# Patient Record
Sex: MALE | Race: WHITE | NOT HISPANIC OR LATINO
[De-identification: names, ages, dates, MRNs, and addresses within clinical notes are randomized per-mention and may not be internally consistent; named-entity substitution may affect disease eponyms.]

---

## 2017-02-27 ENCOUNTER — APPOINTMENT (OUTPATIENT)
Dept: RHEUMATOLOGY | Facility: CLINIC | Age: 62
End: 2017-02-27

## 2017-02-27 VITALS
SYSTOLIC BLOOD PRESSURE: 120 MMHG | HEART RATE: 71 BPM | TEMPERATURE: 97.8 F | BODY MASS INDEX: 25.9 KG/M2 | DIASTOLIC BLOOD PRESSURE: 76 MMHG | OXYGEN SATURATION: 98 % | WEIGHT: 165 LBS | HEIGHT: 67 IN

## 2017-02-27 DIAGNOSIS — B18.2 CHRONIC VIRAL HEPATITIS C: ICD-10-CM

## 2017-02-27 DIAGNOSIS — M06.4 INFLAMMATORY POLYARTHROPATHY: ICD-10-CM

## 2017-02-27 DIAGNOSIS — M06.9 RHEUMATOID ARTHRITIS, UNSPECIFIED: ICD-10-CM

## 2017-02-27 RX ORDER — PREDNISONE 5 MG/1
5 TABLET ORAL
Qty: 30 | Refills: 1 | Status: ACTIVE | COMMUNITY
Start: 2017-02-27 | End: 1900-01-01

## 2017-02-27 RX ORDER — HYDROXYCHLOROQUINE SULFATE 200 MG/1
200 TABLET, FILM COATED ORAL
Qty: 60 | Refills: 1 | Status: ACTIVE | COMMUNITY
Start: 2017-02-27 | End: 1900-01-01

## 2017-04-10 ENCOUNTER — APPOINTMENT (OUTPATIENT)
Dept: RHEUMATOLOGY | Facility: CLINIC | Age: 62
End: 2017-04-10

## 2020-05-02 ENCOUNTER — EMERGENCY (EMERGENCY)
Facility: HOSPITAL | Age: 65
LOS: 1 days | Discharge: HOME | End: 2020-05-02
Attending: EMERGENCY MEDICINE | Admitting: EMERGENCY MEDICINE
Payer: MEDICARE

## 2020-05-02 VITALS
DIASTOLIC BLOOD PRESSURE: 84 MMHG | HEART RATE: 85 BPM | WEIGHT: 138.01 LBS | HEIGHT: 67 IN | SYSTOLIC BLOOD PRESSURE: 129 MMHG | TEMPERATURE: 98 F | OXYGEN SATURATION: 97 % | RESPIRATION RATE: 16 BRPM

## 2020-05-02 LAB
ALBUMIN SERPL ELPH-MCNC: 4.1 G/DL — SIGNIFICANT CHANGE UP (ref 3.3–5)
ALT FLD-CCNC: 31 U/L — SIGNIFICANT CHANGE UP (ref 12–78)
ANION GAP SERPL CALC-SCNC: 6 MMOL/L — SIGNIFICANT CHANGE UP (ref 5–17)
APTT BLD: 29.9 SEC — SIGNIFICANT CHANGE UP (ref 28.5–37)
AST SERPL-CCNC: 43 U/L — HIGH (ref 15–37)
BASOPHILS # BLD AUTO: 0.03 K/UL — SIGNIFICANT CHANGE UP (ref 0–0.2)
BASOPHILS NFR BLD AUTO: 0.3 % — SIGNIFICANT CHANGE UP (ref 0–2)
BILIRUB DIRECT SERPL-MCNC: 0.2 MG/DL — SIGNIFICANT CHANGE UP (ref 0.05–0.2)
BILIRUB INDIRECT FLD-MCNC: 0.1 MG/DL — LOW (ref 0.2–1)
BILIRUB SERPL-MCNC: 0.3 MG/DL — SIGNIFICANT CHANGE UP (ref 0.2–1.2)
BUN SERPL-MCNC: 27 MG/DL — HIGH (ref 7–23)
CALCIUM SERPL-MCNC: 9.4 MG/DL — SIGNIFICANT CHANGE UP (ref 8.5–10.1)
CHLORIDE SERPL-SCNC: 107 MMOL/L — SIGNIFICANT CHANGE UP (ref 96–108)
CO2 SERPL-SCNC: 30 MMOL/L — SIGNIFICANT CHANGE UP (ref 22–31)
CREAT SERPL-MCNC: 1 MG/DL — SIGNIFICANT CHANGE UP (ref 0.5–1.3)
EOSINOPHIL # BLD AUTO: 0.14 K/UL — SIGNIFICANT CHANGE UP (ref 0–0.5)
EOSINOPHIL NFR BLD AUTO: 1.5 % — SIGNIFICANT CHANGE UP (ref 0–6)
GLUCOSE SERPL-MCNC: 111 MG/DL — HIGH (ref 70–99)
HCT VFR BLD CALC: 40 % — SIGNIFICANT CHANGE UP (ref 39–50)
HGB BLD-MCNC: 13.3 G/DL — SIGNIFICANT CHANGE UP (ref 13–17)
IMM GRANULOCYTES NFR BLD AUTO: 0.3 % — SIGNIFICANT CHANGE UP (ref 0–1.5)
INR BLD: 0.97 RATIO — SIGNIFICANT CHANGE UP (ref 0.88–1.16)
LACTATE SERPL-SCNC: 0.6 MMOL/L — LOW (ref 0.7–2)
LIDOCAIN IGE QN: 122 U/L — SIGNIFICANT CHANGE UP (ref 73–393)
LYMPHOCYTES # BLD AUTO: 0.87 K/UL — LOW (ref 1–3.3)
LYMPHOCYTES # BLD AUTO: 9.3 % — LOW (ref 13–44)
MCHC RBC-ENTMCNC: 29.8 PG — SIGNIFICANT CHANGE UP (ref 27–34)
MCHC RBC-ENTMCNC: 33.3 GM/DL — SIGNIFICANT CHANGE UP (ref 32–36)
MCV RBC AUTO: 89.5 FL — SIGNIFICANT CHANGE UP (ref 80–100)
MONOCYTES # BLD AUTO: 0.51 K/UL — SIGNIFICANT CHANGE UP (ref 0–0.9)
MONOCYTES NFR BLD AUTO: 5.4 % — SIGNIFICANT CHANGE UP (ref 2–14)
NEUTROPHILS # BLD AUTO: 7.82 K/UL — HIGH (ref 1.8–7.4)
NEUTROPHILS NFR BLD AUTO: 83.2 % — HIGH (ref 43–77)
NRBC # BLD: 0 /100 WBCS — SIGNIFICANT CHANGE UP (ref 0–0)
PLATELET # BLD AUTO: 160 K/UL — SIGNIFICANT CHANGE UP (ref 150–400)
POTASSIUM SERPL-MCNC: 4 MMOL/L — SIGNIFICANT CHANGE UP (ref 3.5–5.3)
POTASSIUM SERPL-SCNC: 4 MMOL/L — SIGNIFICANT CHANGE UP (ref 3.5–5.3)
PROT SERPL-MCNC: 7.7 G/DL — SIGNIFICANT CHANGE UP (ref 6–8.3)
PROTHROM AB SERPL-ACNC: 10.8 SEC — SIGNIFICANT CHANGE UP (ref 10–12.9)
RBC # BLD: 4.47 M/UL — SIGNIFICANT CHANGE UP (ref 4.2–5.8)
RBC # FLD: 12.8 % — SIGNIFICANT CHANGE UP (ref 10.3–14.5)
SODIUM SERPL-SCNC: 143 MMOL/L — SIGNIFICANT CHANGE UP (ref 135–145)
WBC # BLD: 9.4 K/UL — SIGNIFICANT CHANGE UP (ref 3.8–10.5)
WBC # FLD AUTO: 9.4 K/UL — SIGNIFICANT CHANGE UP (ref 3.8–10.5)

## 2020-05-02 PROCEDURE — 93010 ELECTROCARDIOGRAM REPORT: CPT

## 2020-05-02 PROCEDURE — 74019 RADEX ABDOMEN 2 VIEWS: CPT | Mod: 26

## 2020-05-02 PROCEDURE — 71046 X-RAY EXAM CHEST 2 VIEWS: CPT | Mod: 26

## 2020-05-02 RX ORDER — MORPHINE SULFATE 50 MG/1
4 CAPSULE, EXTENDED RELEASE ORAL ONCE
Refills: 0 | Status: DISCONTINUED | OUTPATIENT
Start: 2020-05-02 | End: 2020-05-02

## 2020-05-02 RX ORDER — SODIUM CHLORIDE 9 MG/ML
1000 INJECTION INTRAMUSCULAR; INTRAVENOUS; SUBCUTANEOUS
Refills: 0 | Status: COMPLETED | OUTPATIENT
Start: 2020-05-02 | End: 2020-05-02

## 2020-05-02 RX ORDER — ONDANSETRON 8 MG/1
4 TABLET, FILM COATED ORAL ONCE
Refills: 0 | Status: COMPLETED | OUTPATIENT
Start: 2020-05-02 | End: 2020-05-02

## 2020-05-02 RX ORDER — IOHEXOL 300 MG/ML
30 INJECTION, SOLUTION INTRAVENOUS ONCE
Refills: 0 | Status: COMPLETED | OUTPATIENT
Start: 2020-05-02 | End: 2020-05-02

## 2020-05-02 RX ADMIN — IOHEXOL 30 MILLILITER(S): 300 INJECTION, SOLUTION INTRAVENOUS at 22:55

## 2020-05-02 RX ADMIN — SODIUM CHLORIDE 1000 MILLILITER(S): 9 INJECTION INTRAMUSCULAR; INTRAVENOUS; SUBCUTANEOUS at 22:56

## 2020-05-02 RX ADMIN — ONDANSETRON 4 MILLIGRAM(S): 8 TABLET, FILM COATED ORAL at 22:56

## 2020-05-02 RX ADMIN — MORPHINE SULFATE 4 MILLIGRAM(S): 50 CAPSULE, EXTENDED RELEASE ORAL at 22:55

## 2020-05-02 RX ADMIN — SODIUM CHLORIDE 1000 MILLILITER(S): 9 INJECTION INTRAMUSCULAR; INTRAVENOUS; SUBCUTANEOUS at 22:57

## 2020-05-02 NOTE — ED PROVIDER NOTE - PROGRESS NOTE DETAILS
Reevaluated patient at bedside.  Patient feeling much improved.  No acute co or changes. Pt wishes to go home.  Discussed the results of all diagnostic testing in ED.  An opportunity to ask questions was given.  Discussed the nature of diagnostic testing in the abdomen and the importance of continued reevaluation.  Understanding of the potential risk of occult pathology was verbalized and the patient will continue to follow-up as an outpatient for further workup and evaluation until resolution.  Discussed the importance of prompt, close medical follow-up.  Patient will return with any changes, concerns or persistent / worsening symptoms.  Discussed CT findings , inc inc stool and chest findings (need for outpt fu) .

## 2020-05-02 NOTE — ED PROVIDER NOTE - CHPI ED SYMPTOMS NEG
no burning urination/no diarrhea/no fever/no hematuria/no chills/no blood in stool/no dysuria/no abdominal distension

## 2020-05-02 NOTE — ED PROVIDER NOTE - CONSTITUTIONAL, MLM
normal... Well appearing, awake, alert, oriented to person, place, time/situation and in no apparent distress. Non-toxic appearing.

## 2020-05-02 NOTE — ED PROVIDER NOTE - OBJECTIVE STATEMENT
64 yo M p/w mid / upper abd pain x past ~ 1 hour. Pos nausea, vomiting / dry heaves. No chest pain. no sob. no diarrhea. no recent travel. no sick contacts. no known suspect foods. No weakness / dizziness. no dysuria / hematuria. Pt with hx SBO in past. Pt with RA, on prednisone. no agg/allev factors. No other inj or co. no known covid contacts.

## 2020-05-02 NOTE — ED PROVIDER NOTE - PATIENT PORTAL LINK FT
You can access the FollowMyHealth Patient Portal offered by [facility] by registering at the following website: [email or web address]. By joining [employer]’s FollowMyHealth portal, you will also be able to view your health information using other applications (apps) compatible with our system.

## 2020-05-02 NOTE — ED ADULT NURSE NOTE - OBJECTIVE STATEMENT
Patient alert and oriented X 3. Complaining of abdominal cramping and nausea since 1900. Denies chest pain, shortness of breath,  vomiting, headache, dizziness and fever. Lungs clears bilaterally. Respirations even and not labored. Abdomen soft tender. WIll continue to moniter.

## 2020-05-02 NOTE — ED ADULT NURSE NOTE - NSIMPLEMENTINTERV_GEN_ALL_ED
Implemented All Universal Safety Interventions:  [place] to call system. Call bell, personal items and telephone within reach. Instruct patient to call for assistance. Room bathroom lighting operational. Non-slip footwear when patient is off stretcher. Physically safe environment: no spills, clutter or unnecessary equipment. Stretcher in lowest position, wheels locked, appropriate side rails in place.

## 2020-05-02 NOTE — ED PROVIDER NOTE - NSFOLLOWUPINSTRUCTIONS_ED_ALL_ED_FT
1) Follow-up with your Primary Medical Doctor, Dr Ceja. Call today / next business day for prompt follow-up.  2) Return to Emergency room for any worsening or persistent pain, weakness, fever, vomiting, diarrhea, unable to eat / drink, weak or dizzy, or any other concerning symptoms.  3) See attached instruction sheets for additional information, including information regarding signs and symptoms to look out for, reasons to seek immediate care and other important instructions.  4) Follow-up with Your Gastroenterologist as discussed, or see referred doctor  5) Plenty of fluids - [place] diet, advanced as tolerated  6) Protonix once daily  7) Zofran as needed for nausea  8) You have been tested today for COVID 19 (Coronavirus).  Currently you do not meet criteria for admission to the hospital.  You are being sent home at this time, but you need to put yourself on HOME ISOLATION.  It is currently recommended at this time that you isolate for the next 14 days unless further instructions are provided at a later date.  When home on home isolation please try to use your own bathroom and bedroom, in an effort to prevent the spread to anyone in your household.  Continue Tylenol per label instructions as needed for fever and body aches  Advance activity as tolerated  Please return to the ED for increased difficulty breathing or signs of respiratory distress, unable to eat / drink, dizziness , passing out, chest pains, or any other concerning symptoms.    Your will be contacted with your results at a later time

## 2020-05-02 NOTE — ED PROVIDER NOTE - GASTROINTESTINAL, MLM
Abdomen soft, pos tender diffuse upper abd, no rebound. no hsm. no cvat, no guarding. non-distended.

## 2020-05-02 NOTE — ED PROVIDER NOTE - CARE PROVIDER_API CALL
Artie Hull)  Gastroenterology  [address] [place] [address]  Phone: [phone]  Fax: [phone]  Follow Up Time:

## 2020-05-02 NOTE — ED ADULT NURSE NOTE - CHPI ED NUR SYMPTOMS NEG
no diarrhea/no hematuria/no vomiting/no fever/no burning urination/no chills/no dysuria/no blood in stool

## 2020-05-03 VITALS
RESPIRATION RATE: 16 BRPM | HEART RATE: 85 BPM | SYSTOLIC BLOOD PRESSURE: 105 MMHG | TEMPERATURE: 99 F | OXYGEN SATURATION: 96 % | DIASTOLIC BLOOD PRESSURE: 53 MMHG

## 2020-05-03 LAB
ALP SERPL-CCNC: 72 U/L — SIGNIFICANT CHANGE UP (ref 40–120)
APPEARANCE UR: CLEAR — SIGNIFICANT CHANGE UP
BACTERIA # UR AUTO: ABNORMAL
BILIRUB UR-MCNC: NEGATIVE — SIGNIFICANT CHANGE UP
BLD GP AB SCN SERPL QL: SIGNIFICANT CHANGE UP
COLOR SPEC: YELLOW — SIGNIFICANT CHANGE UP
DIFF PNL FLD: ABNORMAL
EPI CELLS # UR: SIGNIFICANT CHANGE UP
GLUCOSE UR QL: NEGATIVE — SIGNIFICANT CHANGE UP
HYALINE CASTS # UR AUTO: ABNORMAL /LPF
KETONES UR-MCNC: NEGATIVE — SIGNIFICANT CHANGE UP
LEUKOCYTE ESTERASE UR-ACNC: NEGATIVE — SIGNIFICANT CHANGE UP
NITRITE UR-MCNC: NEGATIVE — SIGNIFICANT CHANGE UP
PH UR: 6.5 — SIGNIFICANT CHANGE UP (ref 5–8)
PROT UR-MCNC: NEGATIVE — SIGNIFICANT CHANGE UP
RBC CASTS # UR COMP ASSIST: ABNORMAL /HPF (ref 0–4)
SARS-COV-2 RNA SPEC QL NAA+PROBE: SIGNIFICANT CHANGE UP
SP GR SPEC: 1.01 — SIGNIFICANT CHANGE UP (ref 1.01–1.02)
UROBILINOGEN FLD QL: NEGATIVE — SIGNIFICANT CHANGE UP
WBC UR QL: NEGATIVE — SIGNIFICANT CHANGE UP

## 2020-05-03 PROCEDURE — 74177 CT ABD & PELVIS W/CONTRAST: CPT | Mod: 26

## 2020-05-03 PROCEDURE — 96374 THER/PROPH/DIAG INJ IV PUSH: CPT | Mod: XU

## 2020-05-03 PROCEDURE — 81001 URINALYSIS AUTO W/SCOPE: CPT

## 2020-05-03 PROCEDURE — 85730 THROMBOPLASTIN TIME PARTIAL: CPT

## 2020-05-03 PROCEDURE — 96375 TX/PRO/DX INJ NEW DRUG ADDON: CPT

## 2020-05-03 PROCEDURE — 71260 CT THORAX DX C+: CPT | Mod: 26

## 2020-05-03 PROCEDURE — 87635 SARS-COV-2 COVID-19 AMP PRB: CPT

## 2020-05-03 PROCEDURE — 85027 COMPLETE CBC AUTOMATED: CPT

## 2020-05-03 PROCEDURE — 86901 BLOOD TYPING SEROLOGIC RH(D): CPT

## 2020-05-03 PROCEDURE — 36415 COLL VENOUS BLD VENIPUNCTURE: CPT

## 2020-05-03 PROCEDURE — 86850 RBC ANTIBODY SCREEN: CPT

## 2020-05-03 PROCEDURE — 93005 ELECTROCARDIOGRAM TRACING: CPT

## 2020-05-03 PROCEDURE — 83605 ASSAY OF LACTIC ACID: CPT

## 2020-05-03 PROCEDURE — 74019 RADEX ABDOMEN 2 VIEWS: CPT

## 2020-05-03 PROCEDURE — 80048 BASIC METABOLIC PNL TOTAL CA: CPT

## 2020-05-03 PROCEDURE — 85610 PROTHROMBIN TIME: CPT

## 2020-05-03 PROCEDURE — 86900 BLOOD TYPING SEROLOGIC ABO: CPT

## 2020-05-03 PROCEDURE — 83690 ASSAY OF LIPASE: CPT

## 2020-05-03 PROCEDURE — 99285 EMERGENCY DEPT VISIT HI MDM: CPT

## 2020-05-03 PROCEDURE — 80076 HEPATIC FUNCTION PANEL: CPT

## 2020-05-03 PROCEDURE — 74177 CT ABD & PELVIS W/CONTRAST: CPT

## 2020-05-03 PROCEDURE — 71260 CT THORAX DX C+: CPT

## 2020-05-03 PROCEDURE — 71046 X-RAY EXAM CHEST 2 VIEWS: CPT

## 2020-05-03 PROCEDURE — 99284 EMERGENCY DEPT VISIT MOD MDM: CPT | Mod: 25

## 2020-05-03 RX ORDER — PANTOPRAZOLE SODIUM 20 MG/1
1 TABLET, DELAYED RELEASE ORAL
Qty: 14 | Refills: 0
Start: 2020-05-03 | End: 2020-05-16

## 2020-05-03 RX ORDER — ONDANSETRON 8 MG/1
1 TABLET, FILM COATED ORAL
Qty: 10 | Refills: 0
Start: 2020-05-03

## 2020-05-03 RX ORDER — MULTIVIT WITH MIN/MFOLATE/K2 340-15/3 G
1 POWDER (GRAM) ORAL ONCE
Refills: 0 | Status: COMPLETED | OUTPATIENT
Start: 2020-05-03 | End: 2020-05-03

## 2020-05-03 RX ADMIN — Medication 1 BOTTLE: at 02:11

## 2020-05-03 RX ADMIN — Medication 1 ENEMA: at 02:11

## 2022-04-29 ENCOUNTER — EMERGENCY (EMERGENCY)
Facility: HOSPITAL | Age: 67
LOS: 1 days | Discharge: HOME | End: 2022-04-29
Attending: EMERGENCY MEDICINE | Admitting: EMERGENCY MEDICINE
Payer: SELF-PAY

## 2022-04-29 VITALS
HEIGHT: 67 IN | OXYGEN SATURATION: 99 % | DIASTOLIC BLOOD PRESSURE: 80 MMHG | TEMPERATURE: 98 F | WEIGHT: 134.92 LBS | RESPIRATION RATE: 15 BRPM | SYSTOLIC BLOOD PRESSURE: 133 MMHG | HEART RATE: 56 BPM

## 2022-04-29 VITALS
DIASTOLIC BLOOD PRESSURE: 71 MMHG | HEART RATE: 54 BPM | OXYGEN SATURATION: 99 % | RESPIRATION RATE: 15 BRPM | SYSTOLIC BLOOD PRESSURE: 121 MMHG

## 2022-04-29 PROBLEM — M05.20 RHEUMATOID VASCULITIS WITH RHEUMATOID ARTHRITIS OF UNSPECIFIED SITE: Chronic | Status: ACTIVE | Noted: 2020-05-02

## 2022-04-29 LAB
ALBUMIN SERPL ELPH-MCNC: 3.7 G/DL — SIGNIFICANT CHANGE UP (ref 3.3–5)
ALP SERPL-CCNC: 67 U/L — SIGNIFICANT CHANGE UP (ref 30–120)
ALT FLD-CCNC: 24 U/L DA — SIGNIFICANT CHANGE UP (ref 10–60)
ANION GAP SERPL CALC-SCNC: 7 MMOL/L — SIGNIFICANT CHANGE UP (ref 5–17)
APTT BLD: 30 SEC — SIGNIFICANT CHANGE UP (ref 27.5–35.5)
AST SERPL-CCNC: 35 U/L — SIGNIFICANT CHANGE UP (ref 10–40)
BASOPHILS # BLD AUTO: 0.03 K/UL — SIGNIFICANT CHANGE UP (ref 0–0.2)
BASOPHILS NFR BLD AUTO: 0.9 % — SIGNIFICANT CHANGE UP (ref 0–2)
BILIRUB SERPL-MCNC: 0.4 MG/DL — SIGNIFICANT CHANGE UP (ref 0.2–1.2)
BUN SERPL-MCNC: 16 MG/DL — SIGNIFICANT CHANGE UP (ref 7–23)
CALCIUM SERPL-MCNC: 9.6 MG/DL — SIGNIFICANT CHANGE UP (ref 8.4–10.5)
CHLORIDE SERPL-SCNC: 102 MMOL/L — SIGNIFICANT CHANGE UP (ref 96–108)
CO2 SERPL-SCNC: 28 MMOL/L — SIGNIFICANT CHANGE UP (ref 22–31)
CREAT SERPL-MCNC: 0.83 MG/DL — SIGNIFICANT CHANGE UP (ref 0.5–1.3)
EGFR: 95 ML/MIN/1.73M2 — SIGNIFICANT CHANGE UP
EOSINOPHIL # BLD AUTO: 0.1 K/UL — SIGNIFICANT CHANGE UP (ref 0–0.5)
EOSINOPHIL NFR BLD AUTO: 3.2 % — SIGNIFICANT CHANGE UP (ref 0–6)
GLUCOSE SERPL-MCNC: 85 MG/DL — SIGNIFICANT CHANGE UP (ref 70–99)
HCT VFR BLD CALC: 38 % — LOW (ref 39–50)
HGB BLD-MCNC: 12.9 G/DL — LOW (ref 13–17)
IMM GRANULOCYTES NFR BLD AUTO: 0.3 % — SIGNIFICANT CHANGE UP (ref 0–1.5)
INR BLD: 1.02 RATIO — SIGNIFICANT CHANGE UP (ref 0.88–1.16)
LYMPHOCYTES # BLD AUTO: 1.07 K/UL — SIGNIFICANT CHANGE UP (ref 1–3.3)
LYMPHOCYTES # BLD AUTO: 33.9 % — SIGNIFICANT CHANGE UP (ref 13–44)
MCHC RBC-ENTMCNC: 30.2 PG — SIGNIFICANT CHANGE UP (ref 27–34)
MCHC RBC-ENTMCNC: 33.9 GM/DL — SIGNIFICANT CHANGE UP (ref 32–36)
MCV RBC AUTO: 89 FL — SIGNIFICANT CHANGE UP (ref 80–100)
MONOCYTES # BLD AUTO: 0.4 K/UL — SIGNIFICANT CHANGE UP (ref 0–0.9)
MONOCYTES NFR BLD AUTO: 12.7 % — SIGNIFICANT CHANGE UP (ref 2–14)
NEUTROPHILS # BLD AUTO: 1.55 K/UL — LOW (ref 1.8–7.4)
NEUTROPHILS NFR BLD AUTO: 49 % — SIGNIFICANT CHANGE UP (ref 43–77)
NRBC # BLD: 0 /100 WBCS — SIGNIFICANT CHANGE UP (ref 0–0)
PLATELET # BLD AUTO: 147 K/UL — LOW (ref 150–400)
POTASSIUM SERPL-MCNC: 5.2 MMOL/L — SIGNIFICANT CHANGE UP (ref 3.5–5.3)
POTASSIUM SERPL-SCNC: 5.2 MMOL/L — SIGNIFICANT CHANGE UP (ref 3.5–5.3)
PROT SERPL-MCNC: 7 G/DL — SIGNIFICANT CHANGE UP (ref 6–8.3)
PROTHROM AB SERPL-ACNC: 12 SEC — SIGNIFICANT CHANGE UP (ref 10.5–13.4)
RBC # BLD: 4.27 M/UL — SIGNIFICANT CHANGE UP (ref 4.2–5.8)
RBC # FLD: 12.7 % — SIGNIFICANT CHANGE UP (ref 10.3–14.5)
SARS-COV-2 RNA SPEC QL NAA+PROBE: SIGNIFICANT CHANGE UP
SODIUM SERPL-SCNC: 137 MMOL/L — SIGNIFICANT CHANGE UP (ref 135–145)
WBC # BLD: 3.16 K/UL — LOW (ref 3.8–10.5)
WBC # FLD AUTO: 3.16 K/UL — LOW (ref 3.8–10.5)

## 2022-04-29 PROCEDURE — 96375 TX/PRO/DX INJ NEW DRUG ADDON: CPT | Mod: XU

## 2022-04-29 PROCEDURE — 85730 THROMBOPLASTIN TIME PARTIAL: CPT

## 2022-04-29 PROCEDURE — 93005 ELECTROCARDIOGRAM TRACING: CPT

## 2022-04-29 PROCEDURE — 96374 THER/PROPH/DIAG INJ IV PUSH: CPT | Mod: XU

## 2022-04-29 PROCEDURE — 74177 CT ABD & PELVIS W/CONTRAST: CPT | Mod: MA

## 2022-04-29 PROCEDURE — 85025 COMPLETE CBC W/AUTO DIFF WBC: CPT

## 2022-04-29 PROCEDURE — 99285 EMERGENCY DEPT VISIT HI MDM: CPT

## 2022-04-29 PROCEDURE — 93010 ELECTROCARDIOGRAM REPORT: CPT

## 2022-04-29 PROCEDURE — 36415 COLL VENOUS BLD VENIPUNCTURE: CPT

## 2022-04-29 PROCEDURE — 71260 CT THORAX DX C+: CPT | Mod: 26,MA

## 2022-04-29 PROCEDURE — 74177 CT ABD & PELVIS W/CONTRAST: CPT | Mod: 26,MA

## 2022-04-29 PROCEDURE — 85610 PROTHROMBIN TIME: CPT

## 2022-04-29 PROCEDURE — 80053 COMPREHEN METABOLIC PANEL: CPT

## 2022-04-29 PROCEDURE — 99285 EMERGENCY DEPT VISIT HI MDM: CPT | Mod: 25

## 2022-04-29 PROCEDURE — 87635 SARS-COV-2 COVID-19 AMP PRB: CPT

## 2022-04-29 PROCEDURE — 71260 CT THORAX DX C+: CPT | Mod: MA

## 2022-04-29 RX ORDER — MORPHINE SULFATE 50 MG/1
4 CAPSULE, EXTENDED RELEASE ORAL ONCE
Refills: 0 | Status: DISCONTINUED | OUTPATIENT
Start: 2022-04-29 | End: 2022-04-29

## 2022-04-29 RX ORDER — OXYCODONE AND ACETAMINOPHEN 5; 325 MG/1; MG/1
1 TABLET ORAL ONCE
Refills: 0 | Status: DISCONTINUED | OUTPATIENT
Start: 2022-04-29 | End: 2022-04-29

## 2022-04-29 RX ORDER — MORPHINE SULFATE 50 MG/1
2 CAPSULE, EXTENDED RELEASE ORAL ONCE
Refills: 0 | Status: DISCONTINUED | OUTPATIENT
Start: 2022-04-29 | End: 2022-04-29

## 2022-04-29 RX ORDER — LIDOCAINE 4 G/100G
1 CREAM TOPICAL ONCE
Refills: 0 | Status: COMPLETED | OUTPATIENT
Start: 2022-04-29 | End: 2022-04-29

## 2022-04-29 RX ORDER — ONDANSETRON 8 MG/1
4 TABLET, FILM COATED ORAL ONCE
Refills: 0 | Status: COMPLETED | OUTPATIENT
Start: 2022-04-29 | End: 2022-04-29

## 2022-04-29 RX ORDER — VENLAFAXINE HCL 75 MG
1 CAPSULE, EXT RELEASE 24 HR ORAL
Qty: 0 | Refills: 0 | DISCHARGE

## 2022-04-29 RX ORDER — ACETAMINOPHEN 500 MG
1000 TABLET ORAL ONCE
Refills: 0 | Status: COMPLETED | OUTPATIENT
Start: 2022-04-29 | End: 2022-04-29

## 2022-04-29 RX ADMIN — OXYCODONE AND ACETAMINOPHEN 1 TABLET(S): 5; 325 TABLET ORAL at 12:50

## 2022-04-29 RX ADMIN — OXYCODONE AND ACETAMINOPHEN 1 TABLET(S): 5; 325 TABLET ORAL at 12:21

## 2022-04-29 RX ADMIN — MORPHINE SULFATE 4 MILLIGRAM(S): 50 CAPSULE, EXTENDED RELEASE ORAL at 10:34

## 2022-04-29 RX ADMIN — LIDOCAINE 1 PATCH: 4 CREAM TOPICAL at 12:07

## 2022-04-29 RX ADMIN — ONDANSETRON 4 MILLIGRAM(S): 8 TABLET, FILM COATED ORAL at 10:34

## 2022-04-29 RX ADMIN — MORPHINE SULFATE 4 MILLIGRAM(S): 50 CAPSULE, EXTENDED RELEASE ORAL at 11:04

## 2022-04-29 NOTE — ED PROVIDER NOTE - NS ED ATTENDING STATEMENT MOD
This was a shared visit with the CHATA. I reviewed and verified the documentation and independently performed the documented:

## 2022-04-29 NOTE — ED PROVIDER NOTE - NSFOLLOWUPINSTRUCTIONS_ED_ALL_ED_FT
Follow up with pcp  return to er for any worsening symptoms   use incentive spirometer as directed          RIB CONTUSION - AfterCare(R) Instructions(ER/ED)           Rib Contusion    WHAT YOU NEED TO KNOW:    A rib contusion is a bruise on one or more of your ribs.   Rib Cage         DISCHARGE INSTRUCTIONS:    Return to the emergency department if:   •You have increased chest pain.       •You have shortness of breath.       •You start to cough up blood.      •Your pain does not improve with pain medicine.      Contact your healthcare provider if:   •You have a cough.      •You have a fever.       •You have questions or concerns about your condition or care.       Medicines: You may need any of the following:   •NSAIDs, such as ibuprofen, help decrease swelling, pain, and fever. This medicine is available with or without a doctor's order. NSAIDs can cause stomach bleeding or kidney problems in certain people. If you take blood thinner medicine, always ask if NSAIDs are safe for you. Always read the medicine label and follow directions. Do not give these medicines to children under 6 months of age without direction from your child's healthcare provider.      •Prescription pain medicine may be given. Ask how to take this medicine safely.      •Take your medicine as directed. Contact your healthcare provider if you think your medicine is not helping or if you have side effects. Tell him of her if you are allergic to any medicine. Keep a list of the medicines, vitamins, and herbs you take. Include the amounts, and when and why you take them. Bring the list or the pill bottles to follow-up visits. Carry your medicine list with you in case of an emergency.      Deep breathing:   •To help prevent pneumonia, take 10 deep breaths every hour, even when you wake up during the night. Brace your ribs with your hands or a pillow while you take deep breaths or cough. This will help decrease your pain.      •You may need to use an incentive spirometer to help you take deeper breaths. Put the plastic piece into your mouth and take a very deep breath. Hold your breath as long as you can. Then let out your breath. Do this 10 times in a row every hour while you are awake.      Rest: Rest your ribs to decrease swelling and allow the injury to heal faster. Avoid activities that may cause more pain or damage to your ribs. As your pain decreases, begin movements slowly.    Ice: Ice helps decrease swelling and pain. Ice may also help prevent tissue damage. Use an ice pack or put crushed ice in a plastic bag. Cover it with a towel and place it on your bruised area for 15 to 20 minutes every hour as directed.    Follow up with your doctor as directed: Write down your questions so you remember to ask them during your visits.        © Copyright [employer] 2022           back to top                          © Copyright [employer] 2022

## 2022-04-29 NOTE — ED PROVIDER NOTE - PATIENT PORTAL LINK FT
You can access the FollowMyHealth Patient Portal offered by [facility] by registering at the following website: [email or web address]. By joining [employer]’s FollowMyHealth portal, you will also be able to view your health information using other applications (apps) compatible with our system.

## 2022-04-29 NOTE — ED ADULT NURSE NOTE - OBJECTIVE STATEMENT
patient fell from height and c/o left side rib pain, c/o pain with position change, IV accessed and tolerating. Labs drawn & sent results pending. patient appears uncomfortable with breathing, no loc, pending xray, will continue to monitor.

## 2022-04-29 NOTE — ED PROVIDER NOTE - CLINICAL SUMMARY MEDICAL DECISION MAKING FREE TEXT BOX
Pt is a 68 yo male s/p fall will get labs ekg and ct chest abdomen pain control Pt is a 66 yo male s/p fall will get labs ekg and ct chest abdomen pain control    DT: I have personally performed a face to face diagnostic evaluation on this patient.  I have reviewed the PA's note and agree with the history, exam, and plan of care, except as noted.  History and Exam by me shows Pt is a 67 male with pmhx of RA BIBEMS s/p fall off 4ft height on 8 ft ladder while at work. Pt states he was coming down lost his balance and trying to avoid his back he turned onto right side denies hitting head no loc neck pain nvd no abdominal pain no blood thinner. Pt denies any numbness tingling weakness + right rib pain with breathing and moving. .  Patient is NAD.  A n O x 3. Head NC/AT. Lungs cta bl. Heart s1,s2, rrr, no murmurs. Abd-soft, nt, no guarding, no rebound, no distension, no cva tenderness. Right chest- no erythema, no ecchymosis, severely tenderness by ant axillary line and 4-5 intercostal area.  Ext- FROM actively,  ambulating s any difficulty.  Labs and ct  were unremarkable.

## 2022-04-29 NOTE — ED PROVIDER NOTE - NSICDXPASTMEDICALHX_GEN_ALL_CORE_FT
PAST MEDICAL HISTORY:  Diverticulitis     Hepatitis C     Rheumatoid arteritis     Rheumatoid arthritis

## 2022-04-29 NOTE — ED PROVIDER NOTE - OBJECTIVE STATEMENT
Pt is a 67 male with pmhx of RA JOSELITOEMS s/p fall off 4ft height on 8 ft ladder while at work. Pt states he was coming down lost his balance and trying to avoid his back he turned onto right side denies hitting head no loc neck pain nvd no abdominal pain no blood thinner. Pt denies any numbness tingling weakness + right rib pain with breathing and moving.

## 2022-04-29 NOTE — ED ADULT NURSE NOTE - NS_ED_NURSE_TEACHING_TOPIC_ED_A_ED
Patient discharged with follow up plan and instruction, incentive spirometer education given. ambulates to dc.

## 2022-04-29 NOTE — ED PROVIDER NOTE - PROGRESS NOTE DETAILS
no acute fractures results d/w pt advised to fu esophagitis and nodules   given incentive spirometer has percocet rx at home pt was putting his clothes on, and started to curse.  I came over and informed him not to curse nicely, he continued and cursing got louder and louder, and was wailing his arms.  Rigoberto Perez called and pt walked out of ED via ambulance entrance c his backpack.

## 2022-04-29 NOTE — ED PROVIDER NOTE - CONSTITUTIONAL, MLM
normal... Well appearing, awake, alert, oriented to person, place, time/situation and in mod apparent distress. nc/at

## 2022-06-28 NOTE — ED PROVIDER NOTE - RELIEVING FACTORS
none Render In Strict Bullet Format?: No Detail Level: Zone Plan: Will consider addition of Cavilon Cream or glycopyrrolate at f/u visit. Initiate Treatment: Mometasone 1% ointment BID PRN.

## 2022-12-11 NOTE — ED PROVIDER NOTE - CARE PROVIDERS DIRECT ADDRESSES
[facility]  12/11/2022    Subjective    No dyspnea    Objective  Vital Last Value 24 Hour Range   Temp 95.5 °F (35.3 °C) (12/11/22 0800) Temp  Min: 95.5 °F (35.3 °C)  Max: 97.9 °F (36.6 °C)   HR (!) 41 (12/11/22 0600) Pulse  Min: 33  Max: 44   RR (!) 22 (12/11/22 0600) Resp  Min: 19  Max: 29   BP (!) 140/79 (12/11/22 0800) BP  Min: 116/46  Max: 172/46   POx 99 % (12/11/22 0600) SpO2  Min: 94 %  Max: 100 %   Body mass index is 31.33 kg/m².  Gen: no apparent distress  CV: NSR, normal s1/s2  Resp: no respiratory distress, CTAB  Ab: non tender, non distended  Skin: warm ,dry, no edema  Neuro: awake, responds to questions, less confused    Labs   Recent Labs   Lab 12/10/22  0515 12/09/22  0541   WBC 10.0 9.6   RBC 3.95* 3.97*   HGB 11.6* 11.7*   HCT 36.0 36.2   MCV 91.1 91.2    191   ANEUT  --  7.2   ALYMS  --  1.4   TANVIR  --  0.9   AEOS  --  0.0   ABASO  --  0.0     Recent Labs   Lab 12/10/22  0515 12/09/22  0541 12/07/22  0520 12/06/22  0554   GLUCOSE 109* 95   < > 135*   SODIUM 137 135   < > 141   POTASSIUM 4.3 4.3   < > 3.9   CHLORIDE 101 100   < > 103   CO2 25 25   < > 24   BUN 75* 67*   < > 43*   CREATININE 1.68* 1.66*   < > 1.71*   CALCIUM 9.0 8.8   < > 9.4   MG  --   --   --  2.5*   TOTPROTEIN  --  7.0  --   --    ALBUMIN  --  2.7*  --   --    AST  --  39*  --   --    ALKPT  --  69  --   --    GPT  --  40  --   --    BILIRUBIN  --  0.4  --   --     < > = values in this interval not displayed.     Assessment and Plan  Principal Problem:    Acute congestive heart failure, unspecified heart failure type (CMS/HCC)  Active Problems:    Bradycardia    Elevated troponin    Acute respiratory failure with hypoxia (CMS/[facility])    Type 2 MI (myocardial infarction) (CMS/[facility])    Hypertensive urgency    Lung nodule    Type 2 diabetes mellitus with hyperglycemia, without long-term current use of insulin (CMS/[facility])    Atypical chest pain          New onset acute decompensated CHF  Acute respiratory failure with  hypoxia    - echo shows:  1. Left ventricle: The cavity size is normal. Wall thickness is mildly to     moderately increased. There is concentric hypertrophy. Systolic function is     normal. The ejection fraction was measured by visual estimation. Doppler     parameters are consistent with abnormal left ventricular relaxation (grade     1 diastolic dysfunction). The ejection fraction is 65%.    - decrease oral lasix dose due to Acute Kidney Injury       Acute metabolic encephalopathy    12/7  - worsening, CT brain showed:  1.  Probable chronic lacunar infarcts in region of bilateral basal ganglia   and left thalamus.   2.  Mild probable chronic changes within bilateral periventricular/deep   white matter.   3.  Minimal left maxillary and left ethmoid sinus disease and small   probable cyst or polyp in left side of frontal sinus.   4.  Otherwise, no definitive acute intracranial findings.  Recommend   follow-up as clinically warranted.       - obtain tsh, b12, ammonia; no evidence of infection; discussed with family  - obtain MRI brain if not improving      Acute CVA  - discussed with radiology  - MRI brain shows:  1.  Acute small infarcts inferomedial aspect left cerebellar hemisphere,  periventricular, deep white matter and subcortical white matter left  frontal parietal lobe and periventricular and deep white matter posterior  aspect right frontal lobe.  There is no evidence of intracranial bleed or  mass effect.     - neurology consulted, physical therapy and occupational therapy following, speech consulted due to aphasia  - on aspirin and statin  - echo already obtained  - ordered ultrasound carotid    12/9  - discussed with nursing and care management , possible rehab      Bradycardia    12/10 - persistent bradycardia, discussed with Cardiology who reported 2nd degree type 2 AV block and recommended transfer to ICU for monitoring for complete heart block, discussed with ICU attending    12/11 - discussed with  ICU, plan for EP eval and possible pacemaker, has persistent severe bradycardia     Type II MI  Atypical chest pain  - cardiology evaluated patient    12/10 - persistent bradycardia, remains on telemetry, discussed with cardiology       Poorly controlled hypertension with hypertensive urgency;   HTN Hrt Dz with CKD    12/6 - start clonidine due to elevated blood pressure      Acute Kidney Injury   - trend creatinine , decrease lasix dose       Type 2 diabetes mellitus with hyperglycemia  Diabetic nephropathy with CKD stage IIIA  CKD stage IIIA       QUITA Lung nodule  \"CT imaging noted with incidental findings of solitary left upper lobe lung nodule.  Outpatient follow-up with PCP upon discharge for reevaluation and surveillance including repeat CT imaging in 12 months as recommended by radiology.\"     Old CVA  Chronic Debilitation              Code status:    Code Status: Full Resuscitation    DVT Prophylaxis:  Current Active Medications for DVT Prophylaxis (From admission, onward)         Stop     heparin (porcine) injection 5,000 Units  5,000 Units,   Subcutaneous,   3 times per day         --              PCP:  MD Dieter Chandra MD      Current Facility-Administered Medications   Medication   • DOPamine (INTROPIN) 400 mg/250 mL in dextrose 5 % infusion   • isosorbide mononitrate (IMDUR) ER tablet 30 mg   • losartan (COZAAR) tablet 50 mg   • pantoprazole (PROTONIX) EC tablet 40 mg   • atropine injection 0.5 mg   • polyethylene glycol (MIRALAX) packet 17 g   • NIFEdipine XL (PROCARDIA XL) ER tablet 120 mg   • furosemide (LASIX) tablet 20 mg   • [Held by provider] hydrALAZINE (APRESOLINE) tablet 100 mg   • sodium chloride 0.9 % flush bag 25 mL   • sodium chloride (PF) 0.9 % injection 2 mL   • Magnesium Standard Replacement Protocol   • ondansetron (ZOFRAN) injection 4 mg   • acetaminophen (TYLENOL) tablet 650 mg   • docusate sodium-sennosides (SENOKOT S) 50-8.6 MG 2 tablet   • aluminum-magnesium  hydroxide-simethicone (MAALOX) 200-200-20 MG/5ML suspension 20 mL   • sodium chloride 0.9 % flush bag 25 mL   • heparin (porcine) injection 5,000 Units   • Potassium Standard Replacement Protocol (Levels 3.5 and lower)   • aspirin (ECOTRIN) enteric coated tablet 81 mg   • atorvastatin (LIPITOR) tablet 40 mg   • dextrose 50 % injection 25 g   • dextrose 50 % injection 12.5 g   • glucagon (GLUCAGEN) injection 1 mg   • dextrose (GLUTOSE) 40 % gel 15 g   • dextrose (GLUTOSE) 40 % gel 30 g   • insulin lispro (ADMELOG,HumaLOG) - Correction Dose   • insulin lispro (ADMELOG,HumaLOG) - Correction Dose   • melatonin tablet 6 mg   • hydrALAZINE (APRESOLINE) injection 10 mg      [email or web address]

## 2023-05-12 ENCOUNTER — EMERGENCY (EMERGENCY)
Facility: HOSPITAL | Age: 68
LOS: 1 days | Discharge: HOME | End: 2023-05-12
Attending: STUDENT IN AN ORGANIZED HEALTH CARE EDUCATION/TRAINING PROGRAM
Payer: MEDICARE

## 2023-05-12 VITALS
HEIGHT: 67 IN | HEART RATE: 78 BPM | TEMPERATURE: 98 F | DIASTOLIC BLOOD PRESSURE: 73 MMHG | SYSTOLIC BLOOD PRESSURE: 120 MMHG | WEIGHT: 136.91 LBS | OXYGEN SATURATION: 96 % | RESPIRATION RATE: 16 BRPM

## 2023-05-12 VITALS
OXYGEN SATURATION: 98 % | DIASTOLIC BLOOD PRESSURE: 71 MMHG | SYSTOLIC BLOOD PRESSURE: 125 MMHG | HEART RATE: 75 BPM | TEMPERATURE: 98 F | RESPIRATION RATE: 17 BRPM

## 2023-05-12 LAB
ALBUMIN SERPL ELPH-MCNC: 3.7 G/DL — SIGNIFICANT CHANGE UP (ref 3.5–5)
ALP SERPL-CCNC: 57 U/L — SIGNIFICANT CHANGE UP (ref 40–120)
ALT FLD-CCNC: 27 U/L DA — SIGNIFICANT CHANGE UP (ref 10–60)
ANION GAP SERPL CALC-SCNC: -1 MMOL/L — LOW (ref 5–17)
AST SERPL-CCNC: 34 U/L — SIGNIFICANT CHANGE UP (ref 10–40)
BASOPHILS # BLD AUTO: 0.02 K/UL — SIGNIFICANT CHANGE UP (ref 0–0.2)
BASOPHILS NFR BLD AUTO: 0.4 % — SIGNIFICANT CHANGE UP (ref 0–2)
BILIRUB SERPL-MCNC: 0.5 MG/DL — SIGNIFICANT CHANGE UP (ref 0.2–1.2)
BUN SERPL-MCNC: 17 MG/DL — SIGNIFICANT CHANGE UP (ref 7–18)
CALCIUM SERPL-MCNC: 9.2 MG/DL — SIGNIFICANT CHANGE UP (ref 8.4–10.5)
CHLORIDE SERPL-SCNC: 110 MMOL/L — HIGH (ref 96–108)
CO2 SERPL-SCNC: 28 MMOL/L — SIGNIFICANT CHANGE UP (ref 22–31)
CREAT SERPL-MCNC: 0.85 MG/DL — SIGNIFICANT CHANGE UP (ref 0.5–1.3)
EGFR: 94 ML/MIN/1.73M2 — SIGNIFICANT CHANGE UP
EOSINOPHIL # BLD AUTO: 0.09 K/UL — SIGNIFICANT CHANGE UP (ref 0–0.5)
EOSINOPHIL NFR BLD AUTO: 1.7 % — SIGNIFICANT CHANGE UP (ref 0–6)
GLUCOSE SERPL-MCNC: 95 MG/DL — SIGNIFICANT CHANGE UP (ref 70–99)
HCT VFR BLD CALC: 41.7 % — SIGNIFICANT CHANGE UP (ref 39–50)
HGB BLD-MCNC: 14.1 G/DL — SIGNIFICANT CHANGE UP (ref 13–17)
IMM GRANULOCYTES NFR BLD AUTO: 0.4 % — SIGNIFICANT CHANGE UP (ref 0–0.9)
LYMPHOCYTES # BLD AUTO: 0.54 K/UL — LOW (ref 1–3.3)
LYMPHOCYTES # BLD AUTO: 10.2 % — LOW (ref 13–44)
MCHC RBC-ENTMCNC: 29.9 PG — SIGNIFICANT CHANGE UP (ref 27–34)
MCHC RBC-ENTMCNC: 33.8 GM/DL — SIGNIFICANT CHANGE UP (ref 32–36)
MCV RBC AUTO: 88.5 FL — SIGNIFICANT CHANGE UP (ref 80–100)
MONOCYTES # BLD AUTO: 0.26 K/UL — SIGNIFICANT CHANGE UP (ref 0–0.9)
MONOCYTES NFR BLD AUTO: 4.9 % — SIGNIFICANT CHANGE UP (ref 2–14)
NEUTROPHILS # BLD AUTO: 4.38 K/UL — SIGNIFICANT CHANGE UP (ref 1.8–7.4)
NEUTROPHILS NFR BLD AUTO: 82.4 % — HIGH (ref 43–77)
NRBC # BLD: 0 /100 WBCS — SIGNIFICANT CHANGE UP (ref 0–0)
PLATELET # BLD AUTO: 127 K/UL — LOW (ref 150–400)
POTASSIUM SERPL-MCNC: 4.7 MMOL/L — SIGNIFICANT CHANGE UP (ref 3.5–5.3)
POTASSIUM SERPL-SCNC: 4.7 MMOL/L — SIGNIFICANT CHANGE UP (ref 3.5–5.3)
PROT SERPL-MCNC: 7.1 G/DL — SIGNIFICANT CHANGE UP (ref 6–8.3)
RBC # BLD: 4.71 M/UL — SIGNIFICANT CHANGE UP (ref 4.2–5.8)
RBC # FLD: 13 % — SIGNIFICANT CHANGE UP (ref 10.3–14.5)
SODIUM SERPL-SCNC: 137 MMOL/L — SIGNIFICANT CHANGE UP (ref 135–145)
TROPONIN I, HIGH SENSITIVITY RESULT: 5.4 NG/L — SIGNIFICANT CHANGE UP
WBC # BLD: 5.31 K/UL — SIGNIFICANT CHANGE UP (ref 3.8–10.5)
WBC # FLD AUTO: 5.31 K/UL — SIGNIFICANT CHANGE UP (ref 3.8–10.5)

## 2023-05-12 PROCEDURE — 93005 ELECTROCARDIOGRAM TRACING: CPT

## 2023-05-12 PROCEDURE — 99285 EMERGENCY DEPT VISIT HI MDM: CPT

## 2023-05-12 PROCEDURE — 36415 COLL VENOUS BLD VENIPUNCTURE: CPT

## 2023-05-12 PROCEDURE — 84484 ASSAY OF TROPONIN QUANT: CPT

## 2023-05-12 PROCEDURE — 80053 COMPREHEN METABOLIC PANEL: CPT

## 2023-05-12 PROCEDURE — 99283 EMERGENCY DEPT VISIT LOW MDM: CPT

## 2023-05-12 PROCEDURE — 85025 COMPLETE CBC W/AUTO DIFF WBC: CPT

## 2023-05-12 RX ORDER — SODIUM CHLORIDE 9 MG/ML
1000 INJECTION INTRAMUSCULAR; INTRAVENOUS; SUBCUTANEOUS ONCE
Refills: 0 | Status: COMPLETED | OUTPATIENT
Start: 2023-05-12 | End: 2023-05-12

## 2023-05-12 RX ADMIN — SODIUM CHLORIDE 1000 MILLILITER(S): 9 INJECTION INTRAMUSCULAR; INTRAVENOUS; SUBCUTANEOUS at 13:54

## 2023-05-12 NOTE — ED PROVIDER NOTE - CLINICAL SUMMARY MEDICAL DECISION MAKING FREE TEXT BOX
Diane: 68-year-old male with past medical history of rheumatoid arthritis (on Remicade infusions) presents status post infusion reaction.  Patient states he had last dose of Remicade approxi-1 month ago, states he had nausea and vomiting during episode.  Patient states approximately 1 hour into Remicade infusion today he started experiencing chest tightness, shortness of breath, nausea, and vomiting.  Denies any fevers, abdominal pain, numbness, weakness, syncope, bloody stools, black tarry stools, or rash.  Per EMS, patient given 125 mg Solu-Medrol and 50 mg of Benadryl with improvement.  Patient denies any complaints at this time and states he feels well.  Patient states chest tightness, shortness of breath, and nausea have resolved.  EKG without acute ischemic changes. Abdomen nontender, not consistent with intraabdominal pathology. No wheezing, rash, or signs of anaphylaxis. Will obtain labs, observe with dispo pending workup.

## 2023-05-12 NOTE — ED PROVIDER NOTE - PATIENT PORTAL LINK FT
You can access the FollowMyHealth Patient Portal offered by [facility] by registering at the following website: [email or web address]. By joining [employer]’s FollowMyHealth portal, you will also be able to view your health information using other applications (apps) compatible with our system.

## 2023-05-12 NOTE — ED ADULT NURSE NOTE - OBJECTIVE STATEMENT
pt is here for allergic reaction.  pt stated that  after getting Remicade IV infusion starting chest pain and shortness of breath, received 50mg benadryl IV and 125mg solumedrol, denied chest pain or sob, denied throat tight, denied rash or itching,

## 2023-05-12 NOTE — ED ADULT NURSE NOTE - NSFALLUNIVINTERV_ED_ALL_ED
Bed/Stretcher in lowest position, wheels locked, appropriate side rails in place/Call bell, personal items and telephone in reach/Instruct patient to call for assistance before getting out of bed/chair/stretcher/Non-slip footwear applied when patient is off stretcher/[place] to call system/Physically safe environment - no spills, clutter or unnecessary equipment/Purposeful proactive rounding/Room/bathroom lighting operational, light cord in reach

## 2023-05-12 NOTE — ED PROVIDER NOTE - PROGRESS NOTE DETAILS
Alfred-: pt seen and re-evaluated at bedside.  Pt comfortable in NAD.  No complaints at this time. Patient tolerating PO intake. Labs non-actionable. Will DC with PMD follow up/return precautions. Patient understood and agreeable with plan.

## 2023-05-12 NOTE — ED PROVIDER NOTE - OBJECTIVE STATEMENT
68-year-old male with past medical history of rheumatoid arthritis (on Remicade infusions) presents status post infusion reaction.  Patient states he had last dose of Remicade approxi-1 month ago, states he had nausea and vomiting during episode.  Patient states approximately 1 hour into Remicade infusion today he started experiencing chest tightness, shortness of breath, nausea, and vomiting.  Denies any fevers, abdominal pain, numbness, weakness, syncope, bloody stools, black tarry stools, or rash.  Per EMS, patient given 125 mg Solu-Medrol and 50 mg of Benadryl with improvement.  Patient denies any complaints at this time and states he feels well.  Patient states chest tightness, shortness of breath, and nausea have resolved.  Denies any additional complaints.

## 2023-05-12 NOTE — ED PROVIDER NOTE - NSFOLLOWUPINSTRUCTIONS_ED_ALL_ED_FT
Rest and stay well hydrated.     Take over the counter acetaminophen (Tylenol) 650-1000 mg every 4-6 hours as needed for pain. Do not take more than 3000 mg in a 24 hour period. Be aware many over the counter and prescription medications also contain acetaminophen (Tylenol).     Follow up with your primary care physician in 1-3 days.     Return with any new or worsening symptoms including fevers, severe pain, difficulty breathing, chest pain, vomiting where you can't tolerate fluids by mouth, fainting, bloody stools, black tarry stools, or any additional concerns.

## 2023-05-12 NOTE — ED ADULT TRIAGE NOTE - CHIEF COMPLAINT QUOTE
shortness of breath after getting Remicade IV infusion, 50mg benadryl IV and 125mg solumedrol IV was give.

## 2024-02-13 NOTE — ED PROVIDER NOTE - WET READ LAUNCH FT
Action Requested: Summary for Provider     []  Critical Lab, Recommendations Needed  [x] Need Additional Advice  []   FYI    []   Need Orders  [] Need Medications Sent to Pharmacy  []  Other     SUMMARY: Pt reports urinary incontinence. Pt states she has no control over her bladder. Urinating more than usual. Pt states she has to wear incontinence pads and cannot leave her home.  Pt denies fever, pain while urinating, denies burning while urinating. Pt reports urine is hazy when she urinates in the toilet, not clear.  Pt states her caregiver checked her blood sugar and was 'high'.   Advised evaluation and check urine.  Pt states she cannot leave her house and caregiver/nurse will be back on Monday.  Advised to push fluids in the meantime.  Please advise. Pt asking for medication. Declined appointment and states cannot give a urine sample.    Reason for call: Incontinence  Onset: Data Unavailable                     Reason for Disposition   Urinating more frequently than usual (i.e., frequency)    Protocols used: Urinary Symptoms-A-OH    
If the patient's glucometer is registering a result as \"high\", then that means that her blood sugars are not controlled and urinary frequency without necessarily being urgent and incontinent can occur.  There is no medication that I can prescribe regarding incontinence without a formal evaluation from a urologist.  If this is a blood sugar issue, then she needs to be seen by a local team of doctors near her home to have her incontinence evaluated including the possibility of blood sugar control (admission).  
Pt advised, appt made for next Wednesday to be evaluated   
There are no Wet Read(s) to document.

## 2024-05-10 PROBLEM — M06.9 RHEUMATOID ARTHRITIS, UNSPECIFIED: Chronic | Status: ACTIVE | Noted: 2023-05-12

## 2024-05-19 ENCOUNTER — NON-APPOINTMENT (OUTPATIENT)
Age: 69
End: 2024-05-19

## 2024-07-12 ENCOUNTER — APPOINTMENT (OUTPATIENT)
Dept: GASTROENTEROLOGY | Facility: CLINIC | Age: 69
End: 2024-07-12
Payer: MEDICARE

## 2024-07-12 VITALS
OXYGEN SATURATION: 98 % | SYSTOLIC BLOOD PRESSURE: 100 MMHG | BODY MASS INDEX: 21.97 KG/M2 | WEIGHT: 140 LBS | HEIGHT: 67 IN | HEART RATE: 78 BPM | DIASTOLIC BLOOD PRESSURE: 70 MMHG

## 2024-07-12 DIAGNOSIS — R13.10 DYSPHAGIA, UNSPECIFIED: ICD-10-CM

## 2024-07-12 PROCEDURE — 99204 OFFICE O/P NEW MOD 45 MIN: CPT

## 2024-07-23 ENCOUNTER — APPOINTMENT (OUTPATIENT)
Dept: GASTROENTEROLOGY | Facility: HOSPITAL | Age: 69
End: 2024-07-23

## 2024-07-23 ENCOUNTER — TRANSCRIPTION ENCOUNTER (OUTPATIENT)
Age: 69
End: 2024-07-23

## 2024-07-23 PROBLEM — M05.20 RHEUMATOID VASCULITIS WITH RHEUMATOID ARTHRITIS OF UNSPECIFIED SITE: Chronic | Status: INACTIVE | Noted: 2020-05-02 | Resolved: 2024-07-23

## 2024-08-01 PROBLEM — R00.2 PALPITATIONS: Chronic | Status: ACTIVE | Noted: 2024-07-23

## 2024-08-01 PROBLEM — J44.9 CHRONIC OBSTRUCTIVE PULMONARY DISEASE, UNSPECIFIED: Chronic | Status: ACTIVE | Noted: 2024-07-23

## 2024-08-01 PROBLEM — J43.9 EMPHYSEMA, UNSPECIFIED: Chronic | Status: ACTIVE | Noted: 2024-07-23

## 2024-08-08 ENCOUNTER — OUTPATIENT (OUTPATIENT)
Dept: OUTPATIENT SERVICES | Facility: HOSPITAL | Age: 69
LOS: 1 days | End: 2024-08-08
Payer: MEDICARE

## 2024-08-08 ENCOUNTER — APPOINTMENT (OUTPATIENT)
Dept: GASTROENTEROLOGY | Facility: CLINIC | Age: 69
End: 2024-08-08

## 2024-08-08 ENCOUNTER — APPOINTMENT (OUTPATIENT)
Dept: CT IMAGING | Facility: IMAGING CENTER | Age: 69
End: 2024-08-08

## 2024-08-08 PROBLEM — K22.0 ACHALASIA: Status: ACTIVE | Noted: 2024-08-08

## 2024-08-08 PROCEDURE — 71260 CT THORAX DX C+: CPT | Mod: 26,MH

## 2024-08-08 PROCEDURE — 99214 OFFICE O/P EST MOD 30 MIN: CPT

## 2024-08-08 PROCEDURE — 71260 CT THORAX DX C+: CPT

## 2024-08-09 NOTE — HISTORY OF PRESENT ILLNESS
[Home] : at home, [unfilled] , at the time of the visit. [Medical Office: ([facility])___] : at the medical office located in  [Family Member] : family member [Verbal consent obtained from patient] : the patient, [unfilled] [FreeTextEntry1] : 69M with pmhx of HCV s/p treatment, diverticulitis, colon lesion s/p resection, RA, COPD presenting for evaluation of recently diagnosed type III achalasia. Referred by Dr. Westfall for POEM. Pt states has had dysphagia symptoms for multiple years but has progressively worsened, particularly over the last 6 months. Now with dysphagia and regurgitation symptoms with every meal. Occasional retrosternal chest pain symptoms. Denies any wt loss. Denies any other complaints, no abd pain, n/v/d/c, melena, hematochezia, fever/chills, jaundice, dark urine, or other issues.

## 2024-08-09 NOTE — PHYSICAL EXAM
[Alert] : alert [Normal Voice/Communication] : normal voice/communication [Healthy Appearing] : healthy appearing [No Acute Distress] : no acute distress [Sclera] : the sclera and conjunctiva were normal [Hearing Threshold Finger Rub Not [place]] : hearing was normal [Normal Lips/Gums] : the lips and gums were normal [Oropharynx] : the oropharynx was normal [Normal Appearance] : the appearance of the neck was normal [No Neck Mass] : no neck mass was observed [No Respiratory Distress] : no respiratory distress [No Acc Muscle Use] : no accessory muscle use [Auscultation Breath Sounds / Voice Sounds] : lungs were clear to auscultation bilaterally [Respiration, Rhythm And Depth] : normal respiratory rhythm and effort [Heart Rate And Rhythm] : heart rate was normal and rhythm regular [Normal S1, S2] : normal S1 and S2 [Murmurs] : no murmurs [Bowel Sounds] : normal bowel sounds [Abdomen Tenderness] : non-tender [No Masses] : no abdominal mass palpated [Abdomen Soft] : soft [] : no hepatosplenomegaly [Oriented To Time, Place, And Person] : oriented to person, place, and time

## 2024-08-09 NOTE — ASSESSMENT
[FreeTextEntry1] : 69M with pmhx of HCV s/p treatment, diverticulitis, colon lesion s/p resection, RA, COPD presenting for evaluation of recently diagnosed achalasia. Referred by Dr. Westfall for POEM. Pt states has had dysphagia symptoms for multiple years but has progressively worsened, particularly over the last 6 months. Now with dysphagia and regurgitation symptoms with every meal. Occasional retrosternal chest pain symptoms. Denies any wt loss. Eckardt score of 7.  - Manometry, esophagram, and EGD findings reviewed. Consistent with achalasia with spastic component consistent with type III. - Long discussion had with patient regarding various achalasia treatment options including pneumatic dilation, botox injection, medications (i.e. nitrates/calcium channel blockers), heller myotomy, and peroral endoscopic myotomy (POEM). Pt opted for POEM romeo in light of spastic component and need for longer myotomy. Risks, benefits, and alternatives discussed including pneumomediastinum, pneumoperitoneum, perforation, bleeding, infection, and future acid reflux. He expressed understanding. Will schedule EGD w/ POEM.   Total time spent to complete patient's assessment, review medical chart including labs & personal review of prior imaging and available endoscopy records, counseling and discussion of plan of care was more than 30 minutes.

## 2024-08-09 NOTE — HISTORY OF PRESENT ILLNESS
[Home] : at home, [unfilled] , at the time of the visit. [Medical Office: ([facility])___] : at the medical office located in  [Family Member] : family member [Verbal consent obtained from patient] : the patient, [unfilled] [FreeTextEntry1] : 69M with pmhx of HCV s/p treatment, diverticulitis, colon lesion s/p resection, RA, COPD presenting for evaluation of recently diagnosed type III achalasia. Referred by Dr. Westfall for POEM. Pt states has had dysphagia symptoms for multiple years but has progressively worsened, particularly over the last 6 months. Now with dysphagia and regurgitation symptoms with every meal. Occasional retrosternal chest pain symptoms. Denies any wt loss. Denies any other complaints, no abd pain, n/v/d/c, melena, hematochezia, fever/chills, jaundice, dark urine, or other issues.

## 2024-08-27 ENCOUNTER — OUTPATIENT (OUTPATIENT)
Dept: OUTPATIENT SERVICES | Facility: HOSPITAL | Age: 69
LOS: 1 days | End: 2024-08-27

## 2024-08-27 VITALS
HEIGHT: 65 IN | DIASTOLIC BLOOD PRESSURE: 70 MMHG | TEMPERATURE: 98 F | SYSTOLIC BLOOD PRESSURE: 113 MMHG | HEART RATE: 66 BPM | OXYGEN SATURATION: 98 % | WEIGHT: 138.01 LBS | RESPIRATION RATE: 18 BRPM

## 2024-08-27 DIAGNOSIS — K22.0 ACHALASIA OF CARDIA: ICD-10-CM

## 2024-08-27 DIAGNOSIS — Z87.898 PERSONAL HISTORY OF OTHER SPECIFIED CONDITIONS: ICD-10-CM

## 2024-08-27 DIAGNOSIS — R13.10 DYSPHAGIA, UNSPECIFIED: ICD-10-CM

## 2024-08-27 DIAGNOSIS — F41.9 ANXIETY DISORDER, UNSPECIFIED: ICD-10-CM

## 2024-08-27 DIAGNOSIS — M06.9 RHEUMATOID ARTHRITIS, UNSPECIFIED: ICD-10-CM

## 2024-08-27 DIAGNOSIS — Z98.890 OTHER SPECIFIED POSTPROCEDURAL STATES: Chronic | ICD-10-CM

## 2024-08-27 RX ORDER — METOPROLOL TARTRATE 100 MG/1
1 TABLET ORAL
Refills: 0 | DISCHARGE

## 2024-08-27 RX ORDER — UMECLIDINIUM BROMIDE AND VILANTEROL TRIFENATATE 62.5; 25 UG/1; UG/1
1 POWDER RESPIRATORY (INHALATION)
Refills: 0 | DISCHARGE

## 2024-08-27 RX ORDER — PREDNISONE 10 MG
1 TABLET, DOSE PACK ORAL
Refills: 0 | DISCHARGE

## 2024-08-27 NOTE — H&P PST ADULT - NSICDXPASTMEDICALHX_GEN_ALL_CORE_FT
PAST MEDICAL HISTORY:  Achalasia of cardia     Anxiety and depression     COPD, moderate     Diverticulitis     H/O emphysema     Heart palpitations     Hepatitis C resolved    Rheumatoid arthritis

## 2024-08-27 NOTE — H&P PST ADULT - PROBLEM SELECTOR PLAN 4
Patient eligible for xochilt risk screen age>75? Yes (if <= 74 then done)    Health care proxy paperwork given to patient? Yes (all patients should be given the packet to fill out at home and return on day of surgery to pre-op RN)

## 2024-08-27 NOTE — H&P PST ADULT - NEGATIVE OPHTHALMOLOGIC SYMPTOMS
wears glasses/no diplopia/no photophobia/no lacrimation L/no lacrimation R/no discharge L/no discharge R

## 2024-08-27 NOTE — H&P PST ADULT - NEUROLOGICAL
normal/sensation intact/responds to pain/responds to verbal commands/no spontaneous movement details…

## 2024-08-27 NOTE — H&P PST ADULT - NEGATIVE ENMT SYMPTOMS
Denies dentures. Denies loose teeth./no hearing difficulty/no ear pain/no tinnitus/no sinus symptoms/no nasal congestion/no nasal discharge/no nasal obstruction/no post-nasal discharge/no nose bleeds/no throat pain

## 2024-08-27 NOTE — H&P PST ADULT - NEGATIVE NEUROLOGICAL SYMPTOMS
no weakness/no paresthesias/no generalized seizures/no focal seizures/no syncope/no tremors/no vertigo/no difficulty walking/no headache/no confusion

## 2024-08-27 NOTE — H&P PST ADULT - GASTROINTESTINAL COMMENTS
pre-op dx: Achalasia of Cardia and Dysphagia unspecified pre-op dx; Achalasia of Cardia and Dysphagia unspecified

## 2024-08-27 NOTE — H&P PST ADULT - PROBLEM SELECTOR PLAN 1
Patient tentatively scheduled for Endoscopy with Peroral endoscopic myotomy on 9/9/24.  Pre-op instructions provided. Pt given verbal and written instructions with teach back on pepcid. Pt verbalized understanding. Patient tentatively scheduled for Endoscopy with Peroral endoscopic myotomy on 9/9/24.  Pre-op instructions provided. Pt given verbal and written instructions with teach back on pepcid. Pt verbalized     Lab work in [facility] from 1/2024. Patient tentatively scheduled for Endoscopy with Peroral endoscopic myotomy on 9/9/24.  Pre-op instructions provided. Pt given verbal and written instructions with teach back on pepcid. Pt verbalized     Has Lab work in [facility] from 1/2024

## 2024-08-27 NOTE — H&P PST ADULT - NSICDXPASTSURGICALHX_GEN_ALL_CORE_FT
PAST SURGICAL HISTORY:  H/O endoscopy     H/O left hemicolectomy Sigmoid Resection in 2002    Hx of appendectomy

## 2024-08-27 NOTE — H&P PST ADULT - HISTORY OF PRESENT ILLNESS
69 year old male with pmhx of HCV s/p Interferon- 2014, Rheumatoid arthritis, COPD, Anxiety and depression presents for pre-op evaluation for diagnosis for pre-op evaluation for diagnosis Achalasia of Cardia and Dysphagia unspecified. Patient is scheduled for diagnosis of Endoscopy with Peroral endoscopic myotomy. Patient c/o dysphagia and regurgitation of food and water started 2 years ago and worsened this past year. Patient went to GI and had Manometry and EGD done and was found to have Achalasia. Pt had weight loss of 30lbs in 6 months. Denies fevers, cp, sob, olguin, n/v/d/c.

## 2024-08-27 NOTE — H&P PST ADULT - TOBACCO USE
SURVEY:    You may be receiving a survey from [employer] regarding your visit today. Please complete the survey to enable us to provide the highest quality of care to you and your family. If you cannot score us a very good on any question, please call the office to discuss how we could of made your experience a very good one. Thank you.       Clinical Care Team:     Dr. Mango Merrill, [facility]      ClericalTeam:     [id number] [facility]
Current some day smoker

## 2024-09-06 NOTE — ASU PATIENT PROFILE, ADULT - FALL HARM RISK - UNIVERSAL INTERVENTIONS
Bed in lowest position, wheels locked, appropriate side rails in place/Call bell, personal items and telephone in reach/Instruct patient to call for assistance before getting out of bed or chair/Non-slip footwear when patient is out of bed/[place] to call system/Physically safe environment - no spills, clutter or unnecessary equipment/Purposeful Proactive Rounding/Room/bathroom lighting operational, light cord in reach

## 2024-09-09 ENCOUNTER — INPATIENT (INPATIENT)
Facility: HOSPITAL | Age: 69
LOS: 1 days | Discharge: HOME | End: 2024-09-11
Attending: HOSPITALIST | Admitting: HOSPITALIST
Payer: MEDICARE

## 2024-09-09 ENCOUNTER — APPOINTMENT (OUTPATIENT)
Dept: GASTROENTEROLOGY | Facility: HOSPITAL | Age: 69
End: 2024-09-09

## 2024-09-09 VITALS
SYSTOLIC BLOOD PRESSURE: 111 MMHG | RESPIRATION RATE: 14 BRPM | DIASTOLIC BLOOD PRESSURE: 74 MMHG | HEART RATE: 75 BPM | OXYGEN SATURATION: 96 % | WEIGHT: 139.99 LBS | TEMPERATURE: 97 F | HEIGHT: 67 IN

## 2024-09-09 DIAGNOSIS — M06.9 RHEUMATOID ARTHRITIS, UNSPECIFIED: ICD-10-CM

## 2024-09-09 DIAGNOSIS — Z86.19 PERSONAL HISTORY OF OTHER INFECTIOUS AND PARASITIC DISEASES: ICD-10-CM

## 2024-09-09 DIAGNOSIS — Z98.890 OTHER SPECIFIED POSTPROCEDURAL STATES: Chronic | ICD-10-CM

## 2024-09-09 DIAGNOSIS — F17.200 NICOTINE DEPENDENCE, UNSPECIFIED, UNCOMPLICATED: ICD-10-CM

## 2024-09-09 DIAGNOSIS — K22.0 ACHALASIA OF CARDIA: ICD-10-CM

## 2024-09-09 DIAGNOSIS — Z87.898 PERSONAL HISTORY OF OTHER SPECIFIED CONDITIONS: ICD-10-CM

## 2024-09-09 DIAGNOSIS — Z29.9 ENCOUNTER FOR PROPHYLACTIC MEASURES, UNSPECIFIED: ICD-10-CM

## 2024-09-09 DIAGNOSIS — R13.10 DYSPHAGIA, UNSPECIFIED: ICD-10-CM

## 2024-09-09 PROCEDURE — 99222 1ST HOSP IP/OBS MODERATE 55: CPT

## 2024-09-09 PROCEDURE — 93010 ELECTROCARDIOGRAM REPORT: CPT

## 2024-09-09 DEVICE — CLIP HEMO INSTINCT PLUS ENDOSCOPIC: Type: IMPLANTABLE DEVICE | Status: FUNCTIONAL

## 2024-09-09 DEVICE — CLIP RESOLUTION 360 ULTRA 235CM 20/BX: Type: IMPLANTABLE DEVICE | Status: FUNCTIONAL

## 2024-09-09 RX ORDER — PANTOPRAZOLE SODIUM 40 MG
40 TABLET, DELAYED RELEASE (ENTERIC COATED) ORAL
Refills: 0 | Status: DISCONTINUED | OUTPATIENT
Start: 2024-09-09 | End: 2024-09-11

## 2024-09-09 RX ORDER — AMOXICILLIN AND CLAVULANATE POTASSIUM 250; 125 MG/1; MG/1
1 TABLET, FILM COATED ORAL DAILY
Refills: 0 | Status: DISCONTINUED | OUTPATIENT
Start: 2024-09-09 | End: 2024-09-09

## 2024-09-09 RX ORDER — HYDROMORPHONE HYDROCHLORIDE 2 MG/1
0.5 TABLET ORAL ONCE
Refills: 0 | Status: DISCONTINUED | OUTPATIENT
Start: 2024-09-09 | End: 2024-09-09

## 2024-09-09 RX ORDER — HYDROMORPHONE HYDROCHLORIDE 2 MG/1
0.3 TABLET ORAL ONCE
Refills: 0 | Status: DISCONTINUED | OUTPATIENT
Start: 2024-09-09 | End: 2024-09-09

## 2024-09-09 RX ORDER — ACETAMINOPHEN 325 MG/1
650 TABLET ORAL EVERY 6 HOURS
Refills: 0 | Status: DISCONTINUED | OUTPATIENT
Start: 2024-09-09 | End: 2024-09-11

## 2024-09-09 RX ORDER — VENLAFAXINE HYDROCHLORIDE 150 MG/1
75 CAPSULE, EXTENDED RELEASE ORAL DAILY
Refills: 0 | Status: DISCONTINUED | OUTPATIENT
Start: 2024-09-09 | End: 2024-09-11

## 2024-09-09 RX ORDER — CEFAZOLIN SODIUM 2 G/100ML
2000 INJECTION, SOLUTION INTRAVENOUS ONCE
Refills: 0 | Status: DISCONTINUED | OUTPATIENT
Start: 2024-09-09 | End: 2024-09-09

## 2024-09-09 RX ORDER — ACETAMINOPHEN 325 MG/1
1000 TABLET ORAL ONCE
Refills: 0 | Status: COMPLETED | OUTPATIENT
Start: 2024-09-09 | End: 2024-09-09

## 2024-09-09 RX ORDER — SODIUM CHLORIDE 9 MG/ML
500 INJECTION INTRAMUSCULAR; INTRAVENOUS; SUBCUTANEOUS
Refills: 0 | Status: DISCONTINUED | OUTPATIENT
Start: 2024-09-09 | End: 2024-09-10

## 2024-09-09 RX ORDER — PANTOPRAZOLE SODIUM 40 MG
40 TABLET, DELAYED RELEASE (ENTERIC COATED) ORAL ONCE
Refills: 0 | Status: COMPLETED | OUTPATIENT
Start: 2024-09-09 | End: 2024-09-09

## 2024-09-09 RX ORDER — METOPROLOL TARTRATE 100 MG/1
50 TABLET ORAL DAILY
Refills: 0 | Status: DISCONTINUED | OUTPATIENT
Start: 2024-09-09 | End: 2024-09-11

## 2024-09-09 RX ORDER — PANTOPRAZOLE SODIUM 40 MG
40 TABLET, DELAYED RELEASE (ENTERIC COATED) ORAL
Refills: 0 | Status: DISCONTINUED | OUTPATIENT
Start: 2024-09-09 | End: 2024-09-09

## 2024-09-09 RX ORDER — AMPICILLIN SODIUM AND SULBACTAM SODIUM 1; .5 G/1; G/1
1.5 INJECTION, POWDER, FOR SOLUTION INTRAMUSCULAR; INTRAVENOUS EVERY 6 HOURS
Refills: 0 | Status: DISCONTINUED | OUTPATIENT
Start: 2024-09-09 | End: 2024-09-11

## 2024-09-09 RX ORDER — PREDNISONE 10 MG
2.5 TABLET, DOSE PACK ORAL
Refills: 0 | Status: DISCONTINUED | OUTPATIENT
Start: 2024-09-09 | End: 2024-09-11

## 2024-09-09 RX ORDER — ACETAMINOPHEN 325 MG/1
1000 TABLET ORAL ONCE
Refills: 0 | Status: DISCONTINUED | OUTPATIENT
Start: 2024-09-09 | End: 2024-09-11

## 2024-09-09 RX ADMIN — HYDROMORPHONE HYDROCHLORIDE 0.3 MILLIGRAM(S): 2 TABLET ORAL at 18:53

## 2024-09-09 RX ADMIN — Medication 40 MILLIGRAM(S): at 11:50

## 2024-09-09 RX ADMIN — HYDROMORPHONE HYDROCHLORIDE 0.5 MILLIGRAM(S): 2 TABLET ORAL at 12:42

## 2024-09-09 RX ADMIN — ACETAMINOPHEN 400 MILLIGRAM(S): 325 TABLET ORAL at 15:50

## 2024-09-09 NOTE — PATIENT PROFILE ADULT - NSPROPTRIGHTSUPPORTPERSON_GEN_A_NUR
Noted.
PA needed for Ivanna smith  TRX code:  [id number]
This is a duplicate request! This was noted in the chart on 02/03/2021. The denial is scanned in media. See previous telephone encounters. Please read notes. Thank you.
declines

## 2024-09-09 NOTE — H&P ADULT - NSHPPHYSICALEXAM_GEN_ALL_CORE
Vital Signs Last 24 Hrs  T(C): 36.1 (09 Sep 2024 11:35), Max: 36.2 (09 Sep 2024 07:41)  T(F): 97 (09 Sep 2024 11:35), Max: 97.1 (09 Sep 2024 07:41)  HR: 66 (09 Sep 2024 13:16) (66 - 77)  BP: 115/64 (09 Sep 2024 13:16) (111/74 - 133/68)  BP(mean): --  RR: 13 (09 Sep 2024 13:16) (13 - 18)  SpO2: 97% (09 Sep 2024 13:16) (95% - 97%)    Parameters below as of 09 Sep 2024 13:16  Patient On (Oxygen Delivery Method): room air        CONSTITUTIONAL: In Moderate pain  EYES: PERRLA; conjunctiva and sclera clear  ENMT: dry oral mucosa, no pharyngeal injection or exudates  NECK: Supple, no palpable masses; no thyromegaly  RESPIRATORY: Normal respiratory effort; lungs are clear to auscultation bilaterally  CARDIOVASCULAR: Distant heart sounds Regular rate and rhythm, normal S1 and S2, no murmur/rub/gallop; No lower extremity edema; Peripheral pulses are 2+ bilaterally  ABDOMEN: mild tenderness to palpation, decreased bowel sounds, no rebound/guarding; No hepatosplenomegaly  MUSCULOSKELETAL:  Normal gait; no clubbing or cyanosis of digits; no joint swelling or tenderness to palpation  PSYCH: A+O to person, place, and time; affect appropriate  NEUROLOGY: CN 2-12 are intact and symmetric; no gross sensory deficits   SKIN: No rashes; no palpable lesions

## 2024-09-09 NOTE — H&P ADULT - NSVTERISKREFERASSESS_GEN_ALL_CORE
[FreeTextEntry3] : AAOx3, pleasant, NAD, no visual lymphadenopathy\par hair, scalp, face, nose, eyelids, ears, lips, oropharynx, neck, chest, abdomen, back, right arm, left arm, nails, and hands examined with all normal findings,\par pertinent findings include:\par \par left temple with mobile nodule with central punctum  Refer to the Assessment tab to view/cancel completed assessment.

## 2024-09-09 NOTE — H&P ADULT - HISTORY OF PRESENT ILLNESS
69 year old male with pmhx of HCV s/p Interferon- 2014, Rheumatoid arthritis, COPD, Anxiety and depression presents s/p endoscopy and POEM procedure for post op monitoring.    Patient had dysphagia and regurgitation of food and water which started 2 years ago and worsened this past year. Patient went to GI and had Manometry and EGD done and was found to have Achalasia. Pt had a weight loss of 30lbs in 6 months.     Today, patient admits to abdominal discomfort and pressure  after procedure but was able to take a few sips of water. He denies chest pain, headache, sob, diarrhea, constipation, nausea or vomiting.     Allergies: No  Smoking: daily 2 cigarettes  Alcohol: occasional  Drugs: marijuana daily  Surgery: no

## 2024-09-09 NOTE — H&P ADULT - PROBLEM SELECTOR PLAN 1
- s/p POEM procedure (peroral endoscopic myotomy)  - plan for clear liquid diet today and tomorrow, then full liquid diet  - Augmentin 875 BID for 5 days  - PPI 40mg BID for 8 weeks

## 2024-09-09 NOTE — H&P ADULT - ASSESSMENT
69 year old male with pmhx of HCV s/p Interferon- 2014, Rheumatoid arthritis, COPD, Anxiety and depression presents s/p endoscopy and POEM procedure for post op monitoring.

## 2024-09-09 NOTE — H&P ADULT - PROBLEM SELECTOR PLAN 6
- diet: able to take sips of water so clear liquid diet for 2 days then full liquid  - code: full  - dispo: from home  - dvt: subq hep held

## 2024-09-09 NOTE — H&P ADULT - NSHPREVIEWOFSYSTEMS_GEN_ALL_CORE
Review of Systems:   CONSTITUTIONAL: No fever  EYES: No eye pain, visual disturbances, or discharge  ENMT:  No difficulty hearing, tinnitus, vertigo; No sinus or throat pain  RESPIRATORY: No SOB. No cough, wheezing, chills  CARDIOVASCULAR: No chest pain, palpitations, dizziness, or leg swelling  GASTROINTESTINAL: + epigastric pressure and pain. No nausea, vomiting, or hematemesis; No diarrhea or constipation.  GENITOURINARY: No dysuria, frequency, hematuria  NEUROLOGICAL: No headaches, loss of strength, numbness, or tremors  SKIN: No itching, burning, rashes, or lesions   ENDOCRINE: No heat or cold intolerance;  MUSCULOSKELETAL: + joint pain in hands; No muscle, back pain  PSYCHIATRIC: No depression, anxiety, mood swings, or difficulty sleeping  HEME/LYMPH: No easy bruising, or bleeding gums

## 2024-09-09 NOTE — PATIENT PROFILE ADULT - FALL HARM RISK - RISK INTERVENTIONS
Assistance OOB with selected safe patient handling equipment/Assistance with ambulation/Communicate Fall Risk and Risk Factors to all staff, patient, and family/Monitor gait and stability/Reinforce activity limits and safety measures with patient and family/Sit up slowly, dangle for a short time, stand at bedside before walking/Use of alarms - bed, chair and/or voice tab/Visual Cue: Yellow wristband/Bed in lowest position, wheels locked, appropriate side rails in place/Call bell, personal items and telephone in reach/Instruct patient to call for assistance before getting out of bed or chair/Non-slip footwear when patient is out of bed/[place] to call system/Physically safe environment - no spills, clutter or unnecessary equipment/Purposeful Proactive Rounding/Room/bathroom lighting operational, light cord in reach

## 2024-09-10 LAB
ANION GAP SERPL CALC-SCNC: 14 MMOL/L — SIGNIFICANT CHANGE UP (ref 7–14)
BUN SERPL-MCNC: 18 MG/DL — SIGNIFICANT CHANGE UP (ref 7–23)
CALCIUM SERPL-MCNC: 9 MG/DL — SIGNIFICANT CHANGE UP (ref 8.4–10.5)
CHLORIDE SERPL-SCNC: 102 MMOL/L — SIGNIFICANT CHANGE UP (ref 98–107)
CO2 SERPL-SCNC: 22 MMOL/L — SIGNIFICANT CHANGE UP (ref 22–31)
CREAT SERPL-MCNC: 0.72 MG/DL — SIGNIFICANT CHANGE UP (ref 0.5–1.3)
EGFR: 98 ML/MIN/1.73M2 — SIGNIFICANT CHANGE UP
GLUCOSE SERPL-MCNC: 97 MG/DL — SIGNIFICANT CHANGE UP (ref 70–99)
HCT VFR BLD CALC: 33.2 % — LOW (ref 39–50)
HGB BLD-MCNC: 11.4 G/DL — LOW (ref 13–17)
MAGNESIUM SERPL-MCNC: 2.1 MG/DL — SIGNIFICANT CHANGE UP (ref 1.6–2.6)
MCHC RBC-ENTMCNC: 29.5 PG — SIGNIFICANT CHANGE UP (ref 27–34)
MCHC RBC-ENTMCNC: 34.3 GM/DL — SIGNIFICANT CHANGE UP (ref 32–36)
MCV RBC AUTO: 85.8 FL — SIGNIFICANT CHANGE UP (ref 80–100)
MRSA PCR RESULT.: SIGNIFICANT CHANGE UP
NRBC # BLD: 0 /100 WBCS — SIGNIFICANT CHANGE UP (ref 0–0)
NRBC # FLD: 0 K/UL — SIGNIFICANT CHANGE UP (ref 0–0)
PHOSPHATE SERPL-MCNC: 2.8 MG/DL — SIGNIFICANT CHANGE UP (ref 2.5–4.5)
PLATELET # BLD AUTO: 102 K/UL — LOW (ref 150–400)
POTASSIUM SERPL-MCNC: 4.1 MMOL/L — SIGNIFICANT CHANGE UP (ref 3.5–5.3)
POTASSIUM SERPL-SCNC: 4.1 MMOL/L — SIGNIFICANT CHANGE UP (ref 3.5–5.3)
RBC # BLD: 3.87 M/UL — LOW (ref 4.2–5.8)
RBC # FLD: 12.7 % — SIGNIFICANT CHANGE UP (ref 10.3–14.5)
S AUREUS DNA NOSE QL NAA+PROBE: SIGNIFICANT CHANGE UP
SODIUM SERPL-SCNC: 138 MMOL/L — SIGNIFICANT CHANGE UP (ref 135–145)
WBC # BLD: 4 K/UL — SIGNIFICANT CHANGE UP (ref 3.8–10.5)
WBC # FLD AUTO: 4 K/UL — SIGNIFICANT CHANGE UP (ref 3.8–10.5)

## 2024-09-10 PROCEDURE — 74220 X-RAY XM ESOPHAGUS 1CNTRST: CPT | Mod: 26

## 2024-09-10 PROCEDURE — 99232 SBSQ HOSP IP/OBS MODERATE 35: CPT

## 2024-09-10 PROCEDURE — 99222 1ST HOSP IP/OBS MODERATE 55: CPT | Mod: GC

## 2024-09-10 RX ORDER — HYDROMORPHONE HYDROCHLORIDE 2 MG/1
0.3 TABLET ORAL ONCE
Refills: 0 | Status: DISCONTINUED | OUTPATIENT
Start: 2024-09-10 | End: 2024-09-10

## 2024-09-10 RX ORDER — SODIUM CHLORIDE 9 MG/ML
1000 INJECTION INTRAMUSCULAR; INTRAVENOUS; SUBCUTANEOUS
Refills: 0 | Status: DISCONTINUED | OUTPATIENT
Start: 2024-09-10 | End: 2024-09-10

## 2024-09-10 RX ORDER — HYDROMORPHONE HYDROCHLORIDE 2 MG/1
0.3 TABLET ORAL EVERY 4 HOURS
Refills: 0 | Status: DISCONTINUED | OUTPATIENT
Start: 2024-09-10 | End: 2024-09-11

## 2024-09-10 RX ADMIN — Medication 2.5 MILLIGRAM(S): at 05:31

## 2024-09-10 RX ADMIN — HYDROMORPHONE HYDROCHLORIDE 0.3 MILLIGRAM(S): 2 TABLET ORAL at 01:00

## 2024-09-10 RX ADMIN — Medication 40 MILLIGRAM(S): at 18:28

## 2024-09-10 RX ADMIN — HYDROMORPHONE HYDROCHLORIDE 0.3 MILLIGRAM(S): 2 TABLET ORAL at 04:48

## 2024-09-10 RX ADMIN — HYDROMORPHONE HYDROCHLORIDE 0.3 MILLIGRAM(S): 2 TABLET ORAL at 00:00

## 2024-09-10 RX ADMIN — HYDROMORPHONE HYDROCHLORIDE 0.3 MILLIGRAM(S): 2 TABLET ORAL at 11:38

## 2024-09-10 RX ADMIN — Medication 40 MILLIGRAM(S): at 05:31

## 2024-09-10 RX ADMIN — AMPICILLIN SODIUM AND SULBACTAM SODIUM 100 GRAM(S): 1; .5 INJECTION, POWDER, FOR SOLUTION INTRAMUSCULAR; INTRAVENOUS at 13:15

## 2024-09-10 RX ADMIN — Medication 2.5 MILLIGRAM(S): at 18:28

## 2024-09-10 RX ADMIN — HYDROMORPHONE HYDROCHLORIDE 0.3 MILLIGRAM(S): 2 TABLET ORAL at 00:01

## 2024-09-10 RX ADMIN — METOPROLOL TARTRATE 50 MILLIGRAM(S): 100 TABLET ORAL at 05:31

## 2024-09-10 RX ADMIN — AMPICILLIN SODIUM AND SULBACTAM SODIUM 100 GRAM(S): 1; .5 INJECTION, POWDER, FOR SOLUTION INTRAMUSCULAR; INTRAVENOUS at 18:29

## 2024-09-10 RX ADMIN — VENLAFAXINE HYDROCHLORIDE 75 MILLIGRAM(S): 150 CAPSULE, EXTENDED RELEASE ORAL at 13:16

## 2024-09-10 RX ADMIN — HYDROMORPHONE HYDROCHLORIDE 0.3 MILLIGRAM(S): 2 TABLET ORAL at 05:48

## 2024-09-10 RX ADMIN — AMPICILLIN SODIUM AND SULBACTAM SODIUM 100 GRAM(S): 1; .5 INJECTION, POWDER, FOR SOLUTION INTRAMUSCULAR; INTRAVENOUS at 05:30

## 2024-09-10 RX ADMIN — AMPICILLIN SODIUM AND SULBACTAM SODIUM 100 GRAM(S): 1; .5 INJECTION, POWDER, FOR SOLUTION INTRAMUSCULAR; INTRAVENOUS at 00:09

## 2024-09-10 NOTE — PROVIDER CONTACT NOTE (OTHER) - ASSESSMENT
pt A&O4, VSS, on room air, no respiratory distress noted, no c/o chest pain. pt c/o pain in throat post procedure on the scale of 10. Difficulty swallowing solid food. pt states I want to talk to doctor else I am leaving.

## 2024-09-10 NOTE — CONSULT NOTE ADULT - SUBJECTIVE AND OBJECTIVE BOX
Chief Complaint:  chest pressure    HPI:    Mr. Lackey is a 69 year old male with RA and achalasia s/p POEM 9/9 who is admitted following outpatient POEM for pain control. Patient reports chest pressure, heartburn, nausea and occasional vomiting following POEM which are improving. He is tolerating clears and apple sauce without dysphagia, odynophagia or abdominal pain.     Allergies:  No Known Allergies    Hospital Medications:  acetaminophen     Tablet .. 650 milliGRAM(s) Oral every 6 hours PRN  acetaminophen   IVPB .. 1000 milliGRAM(s) IV Intermittent once PRN  ampicillin/sulbactam  IVPB 1.5 Gram(s) IV Intermittent every 6 hours  metoprolol succinate ER 50 milliGRAM(s) Oral daily  pantoprazole  Injectable 40 milliGRAM(s) IV Push two times a day  predniSONE   Tablet 2.5 milliGRAM(s) Oral two times a day  venlafaxine 75 milliGRAM(s) Oral daily    PMHX/PSHX:  Diverticulitis    Rheumatoid arthritis    Hepatitis C    Rheumatoid arteritis    Rheumatoid arthritis    Heart palpitations    COPD, moderate    H/O emphysema    Anxiety and depression    Achalasia of cardia    Hx of appendectomy    H/O left hemicolectomy    No significant past surgical history    H/O endoscopy    Family history:  Family history of autoimmune disorder    Social History:   No alcohol or smoking    ROS:   See HPI    PHYSICAL EXAM:   GENERAL:  NAD, resting comfortably in bed  HEENT:  Sclera anicteric  RESPIRATORY:  Normal effort  CARDIAC:  HDS  ABDOMEN:  Soft, non-tender, non-distended  EXTREMITIES:  No edema  SKIN:  Warm & Dry. No jaundice.   NEURO:  Alert, conversant, no focal deficit    Vital Signs:  Vital Signs Last 24 Hrs  T(C): 37.1 (10 Sep 2024 07:43), Max: 37.1 (10 Sep 2024 07:43)  T(F): 98.8 (10 Sep 2024 07:43), Max: 98.8 (10 Sep 2024 07:43)  HR: 71 (10 Sep 2024 07:43) (58 - 79)  BP: 116/61 (10 Sep 2024 07:43) (115/64 - 133/68)  BP(mean): --  RR: 18 (10 Sep 2024 07:43) (13 - 18)  SpO2: 96% (10 Sep 2024 07:43) (95% - 99%)    Parameters below as of 10 Sep 2024 07:43  Patient On (Oxygen Delivery Method): room air      Daily Height in cm: 154.17 (09 Sep 2024 19:44)    Daily     LABS:    Imaging:    < from: Upper Endoscopy (09.09.24 @ 08:42) >  Findings:       There were areas of spasticity noted in the body of the esophagus with        the most proximal segment of spasticity visualized endoscopically at 28        cm from the incisors which was consistent with patient's manometry        results. There was severe resistance encountered at the lower esophageal        sphincter at 42 cm from the incisors.The Z-line was regular. Findings        consistent with known type III achalasia. Preparations were made for        peroral endoscopic myotomy (POEM). Mucosotomy followed by myotomy were        performed in a posterior orientation. First, a submucosal injection of a        solution of methylene blue and saline/hextend was used to lift the        mucosa at the site of the initial mucosotomy. The initial mucosal        incision was made longitudinally starting at 25 cm from the incisors        using a HybridKnife T-Type. Next, the endoscope with a clear cap was        used to enter into the submucosal tunnel. The submucosal tunnel was then        further created by continued dissection. The submucosal tunnel was        extended to 45 cm from the incisors which included extension into the        cardia by 3 cm. The myotomy was started at 27 cm from the incisors using        a HybridKnife T-Type. A circular myotomy was extended to 37 cm followed        by a full thickness myotomy extending to44 cm from the incisors which        included extension into the cardia by 2 cm. Intra procedure bleeding was        mild. A large Coagrasper was used effectively for hemostasis. Hemostasis        was successfully accomplished throughout the procedure. After completion        of the myotomy, there was no evidence of bleeding noted on the        inspection of the myotomy edges and submucosal tunnel. The myotomy was        successfully performed. The muscular division was complete. The mucosal     entrance to the submucosal tunnel was closed using endoscopic clips.       The entire examined stomach was normal.       The duodenal bulb and second portion of the duodenum were normal.                      Impression:          - Known type III achalasia. Successful POEM performed                        with extended myotomy as above.  Recommendation:      - The patient will be observed post-procedure, until all    discharge criteria are met.                       - Clear liquid diet today and tomorrow, then full liquid                        diet, then soft diet for one week.                       - Use Augmentin 875 mg orally twice daily for 5 days.               - Use Pantoprazole 40 mg orally twice daily for 8 weeks,                        then once daily thereafter.                       - Follow-up in my office in 2 weeks.                       - Patient has a contact number available for                      emergencies. The signs and symptoms of potential delayed                        complications were discussed with the patient. Return to                        normal activities tomorrow. Written discharge                        instructions were provided to the patient.    < end of copied text >

## 2024-09-10 NOTE — CONSULT NOTE ADULT - ASSESSMENT
Mr. Lackey is a 69 year old male with RA and achalasia s/p POEM 9/9 who is admitted following outpatient POEM for pain control.    #Pain control  #S/p POEM 9/9/24  #Achalasia, type 3  Patient with post-procedural heartburn, chest pressure, nausea and vomiting  Symptoms are gradually improving as today, tolerating apple sauce and liquids    Recommendations:  - Anti-emetics and analgesia as needed  - Trial full liquid diet  - Augmentin 875 mg orally twice daily for 5 days  - Pantoprazole 40 mg orally twice daily for 8 weeks, then once daily thereafter  - Obtain XR esophagram      Sundeep Stevenson MD  Gastroenterology/Hepatology Fellow  Available via [employer] Teams  Pager: [phone]    On Weekdays after 5 PM to 8AM and Weekends/Holidays (All day)  For non-urgent consults, please email [email or web address] or [email or web address]  For urgent consults, please contact on call GI team.  See Amion schedule ([facility]), Beststud[employer] system - [phone] ([facility]), or call hospital  ([facility])

## 2024-09-10 NOTE — PROVIDER CONTACT NOTE (OTHER) - SITUATION
pt c/o pain in throat, post procedure on the scale of 10. pt states, I take percocet at home 4-5 times a day. I need pain meds. pt states 0.3mg IV push Dilaudid helped earlier.

## 2024-09-10 NOTE — PROGRESS NOTE ADULT - PROBLEM SELECTOR PLAN 1
- s/p POEM procedure (peroral endoscopic myotomy)  - plan for clear liquid diet today, monitor tolerance, then full liquid diet  - Augmentin 875 BID for 5 days  - PPI 40mg BID for 8 weeks  dilaudid 0.3 mg q4 for pain

## 2024-09-11 ENCOUNTER — TRANSCRIPTION ENCOUNTER (OUTPATIENT)
Age: 69
End: 2024-09-11

## 2024-09-11 VITALS
DIASTOLIC BLOOD PRESSURE: 64 MMHG | OXYGEN SATURATION: 98 % | RESPIRATION RATE: 16 BRPM | TEMPERATURE: 98 F | SYSTOLIC BLOOD PRESSURE: 121 MMHG | HEART RATE: 62 BPM

## 2024-09-11 LAB
ANION GAP SERPL CALC-SCNC: 12 MMOL/L — SIGNIFICANT CHANGE UP (ref 7–14)
BUN SERPL-MCNC: 14 MG/DL — SIGNIFICANT CHANGE UP (ref 7–23)
CALCIUM SERPL-MCNC: 8.9 MG/DL — SIGNIFICANT CHANGE UP (ref 8.4–10.5)
CHLORIDE SERPL-SCNC: 101 MMOL/L — SIGNIFICANT CHANGE UP (ref 98–107)
CO2 SERPL-SCNC: 22 MMOL/L — SIGNIFICANT CHANGE UP (ref 22–31)
CREAT SERPL-MCNC: 0.73 MG/DL — SIGNIFICANT CHANGE UP (ref 0.5–1.3)
EGFR: 98 ML/MIN/1.73M2 — SIGNIFICANT CHANGE UP
GLUCOSE SERPL-MCNC: 108 MG/DL — HIGH (ref 70–99)
HCT VFR BLD CALC: 31.9 % — LOW (ref 39–50)
HGB BLD-MCNC: 11.2 G/DL — LOW (ref 13–17)
MAGNESIUM SERPL-MCNC: 1.9 MG/DL — SIGNIFICANT CHANGE UP (ref 1.6–2.6)
MCHC RBC-ENTMCNC: 29.9 PG — SIGNIFICANT CHANGE UP (ref 27–34)
MCHC RBC-ENTMCNC: 35.1 GM/DL — SIGNIFICANT CHANGE UP (ref 32–36)
MCV RBC AUTO: 85.3 FL — SIGNIFICANT CHANGE UP (ref 80–100)
NRBC # BLD: 0 /100 WBCS — SIGNIFICANT CHANGE UP (ref 0–0)
NRBC # FLD: 0 K/UL — SIGNIFICANT CHANGE UP (ref 0–0)
PHOSPHATE SERPL-MCNC: 2 MG/DL — LOW (ref 2.5–4.5)
PLATELET # BLD AUTO: 104 K/UL — LOW (ref 150–400)
POTASSIUM SERPL-MCNC: 3.4 MMOL/L — LOW (ref 3.5–5.3)
POTASSIUM SERPL-SCNC: 3.4 MMOL/L — LOW (ref 3.5–5.3)
RBC # BLD: 3.74 M/UL — LOW (ref 4.2–5.8)
RBC # FLD: 12.7 % — SIGNIFICANT CHANGE UP (ref 10.3–14.5)
SODIUM SERPL-SCNC: 135 MMOL/L — SIGNIFICANT CHANGE UP (ref 135–145)
WBC # BLD: 4.38 K/UL — SIGNIFICANT CHANGE UP (ref 3.8–10.5)
WBC # FLD AUTO: 4.38 K/UL — SIGNIFICANT CHANGE UP (ref 3.8–10.5)

## 2024-09-11 PROCEDURE — 99232 SBSQ HOSP IP/OBS MODERATE 35: CPT | Mod: GC

## 2024-09-11 PROCEDURE — 99239 HOSP IP/OBS DSCHRG MGMT >30: CPT

## 2024-09-11 RX ORDER — SODIUM PHOSPHATE, DIBASIC, ANHYDROUS, POTASSIUM PHOSPHATE, MONOBASIC, AND SODIUM PHOSPHATE, MONOBASIC, MONOHYDRATE 852; 155; 130 MG/1; MG/1; MG/1
1 TABLET, COATED ORAL
Refills: 0 | Status: DISCONTINUED | OUTPATIENT
Start: 2024-09-11 | End: 2024-09-11

## 2024-09-11 RX ORDER — POTASSIUM CHLORIDE 10 MEQ
40 TABLET, EXT RELEASE, PARTICLES/CRYSTALS ORAL ONCE
Refills: 0 | Status: COMPLETED | OUTPATIENT
Start: 2024-09-11 | End: 2024-09-11

## 2024-09-11 RX ORDER — AMOXICILLIN AND CLAVULANATE POTASSIUM 250; 125 MG/1; MG/1
1 TABLET, FILM COATED ORAL
Qty: 10 | Refills: 0
Start: 2024-09-11 | End: 2024-09-15

## 2024-09-11 RX ORDER — PANTOPRAZOLE SODIUM 40 MG
1 TABLET, DELAYED RELEASE (ENTERIC COATED) ORAL
Qty: 60 | Refills: 0
Start: 2024-09-11 | End: 2024-10-10

## 2024-09-11 RX ORDER — ACETAMINOPHEN 325 MG/1
2 TABLET ORAL
Qty: 0 | Refills: 0 | DISCHARGE
Start: 2024-09-11

## 2024-09-11 RX ORDER — CELECOXIB 400 MG/1
1 CAPSULE ORAL
Refills: 0 | DISCHARGE

## 2024-09-11 RX ADMIN — AMPICILLIN SODIUM AND SULBACTAM SODIUM 100 GRAM(S): 1; .5 INJECTION, POWDER, FOR SOLUTION INTRAMUSCULAR; INTRAVENOUS at 12:49

## 2024-09-11 RX ADMIN — Medication 40 MILLIEQUIVALENT(S): at 08:55

## 2024-09-11 RX ADMIN — METOPROLOL TARTRATE 50 MILLIGRAM(S): 100 TABLET ORAL at 05:47

## 2024-09-11 RX ADMIN — AMPICILLIN SODIUM AND SULBACTAM SODIUM 100 GRAM(S): 1; .5 INJECTION, POWDER, FOR SOLUTION INTRAMUSCULAR; INTRAVENOUS at 05:48

## 2024-09-11 RX ADMIN — VENLAFAXINE HYDROCHLORIDE 75 MILLIGRAM(S): 150 CAPSULE, EXTENDED RELEASE ORAL at 12:50

## 2024-09-11 RX ADMIN — AMPICILLIN SODIUM AND SULBACTAM SODIUM 100 GRAM(S): 1; .5 INJECTION, POWDER, FOR SOLUTION INTRAMUSCULAR; INTRAVENOUS at 00:07

## 2024-09-11 RX ADMIN — SODIUM PHOSPHATE, DIBASIC, ANHYDROUS, POTASSIUM PHOSPHATE, MONOBASIC, AND SODIUM PHOSPHATE, MONOBASIC, MONOHYDRATE 1 PACKET(S): 852; 155; 130 TABLET, COATED ORAL at 12:49

## 2024-09-11 RX ADMIN — SODIUM PHOSPHATE, DIBASIC, ANHYDROUS, POTASSIUM PHOSPHATE, MONOBASIC, AND SODIUM PHOSPHATE, MONOBASIC, MONOHYDRATE 1 PACKET(S): 852; 155; 130 TABLET, COATED ORAL at 08:55

## 2024-09-11 RX ADMIN — Medication 40 MILLIGRAM(S): at 05:47

## 2024-09-11 RX ADMIN — Medication 2.5 MILLIGRAM(S): at 05:47

## 2024-09-11 NOTE — DISCHARGE NOTE PROVIDER - PROVIDER TOKENS
PROVIDER:[TOKEN:[[id number]],FOLLOWUP:[2 weeks]] PROVIDER:[TOKEN:[[id number]],FOLLOWUP:[2 weeks]]

## 2024-09-11 NOTE — DISCHARGE NOTE PROVIDER - HOSPITAL COURSE
Pt s/p POEM  post procedure pain  improved pain control  lilliana advanced diet  outpt f/u with GI

## 2024-09-11 NOTE — DISCHARGE NOTE PROVIDER - NSDCCPCAREPLAN_GEN_ALL_CORE_FT
PRINCIPAL DISCHARGE DIAGNOSIS  Diagnosis: Achalasia  Assessment and Plan of Treatment: s/p POEM, improved ability to tolerate food; Please follow up with GI as out patient      SECONDARY DISCHARGE DIAGNOSES  Diagnosis: Rheumatoid arthritis  Assessment and Plan of Treatment: Continue outpatient follow up as routine

## 2024-09-11 NOTE — PROGRESS NOTE ADULT - ATTENDING COMMENTS
Agree with above.    Patient was seen by our team on 09-11-24. I agree with the findings and pan of care as documented in the fellow/resident/medical student/physician assistant.    Today we are treating the patient for:   Type III Achalasia status post POEM    ***General note with plan / recommendation:   Doing well. Agree with discharge with above recommendations.       Billing:  I have reviewed records from Labs  I independently interpreted the following tests and my findings are: Type III Achalasia status post POEM    Other:  - Thank you for involving us in the care of this patient. If you have any questions regarding the plan of care please do not hesitate to call us back.  - For any questions on Monday - Friday 8 am to 5pm please message via [employer] or email [email or web address]   - For any Urgent off hours consults please contact on- call GI team. See Amion schedule ([facility]), [employer] paging system ([facility]), or call hospital  ([facility])  - Rest of management as per primary team

## 2024-09-11 NOTE — DISCHARGE NOTE PROVIDER - NSDCMRMEDTOKEN_GEN_ALL_CORE_FT
acetaminophen 325 mg oral tablet: 2 tab(s) orally every 6 hours As needed Temp greater or equal to 38C (100.4F), Mild Pain (1 - 3)  amoxicillin-clavulanate 875 mg-125 mg oral tablet: 1 tab(s) orally 2 times a day  Anoro Ellipta 62.5 mcg-25 mcg/inh inhalation powder: 1 puff(s) inhaled once a day  Effexor 75 mg oral tablet: 1 tab(s) orally once a day  metoprolol succinate 50 mg oral tablet, extended release: 1 tab(s) orally once a day  Percocet 10/325 oral tablet: takes up to 4x/day for pain  predniSONE 2.5 mg oral tablet: 1 tab(s) orally 2 times a day  Protonix 40 mg oral delayed release tablet: 1 tab(s) orally 2 times a day

## 2024-09-11 NOTE — DISCHARGE NOTE PROVIDER - CARE PROVIDERS DIRECT ADDRESSES
,[email or web address]

## 2024-09-11 NOTE — PROGRESS NOTE ADULT - ASSESSMENT
Mr. Lackey is a 69 year old male with RA and achalasia s/p POEM 9/9 who is admitted following outpatient POEM for pain control.    #Pain control  #S/p POEM 9/9/24  #Achalasia, type 3  Patient with post-procedural heartburn, chest pressure, nausea and vomiting - now resolved  XR esophagram without leak  patient feeling significantly improved and tolerating a full liquid diet     Recommendations:  - Okay to discharge home with GI follow-up  - Regular diet  - Augmentin 875 mg orally twice daily for 5 days  - Pantoprazole 40 mg orally twice daily for 8 weeks, then once daily thereafter  - Tylenol as needed  - Avoid NSAIDS  - F/u Dr. Westfall (GI)      Sundeep Stevenson MD  Gastroenterology/Hepatology Fellow  Available via [employer] Teams  Pager: [phone]    On Weekdays after 5 PM to 8AM and Weekends/Holidays (All day)  For non-urgent consults, please email [email or web address]  For urgent consults, please contact on call GI team.  See Amion schedule ([facility]), [employer] system - [id number] ([facility]), or call hospital  ([facility])
69 year old male with pmhx of HCV s/p Interferon- 2014, Rheumatoid arthritis, COPD, Anxiety and depression presents s/p endoscopy and POEM procedure for post op monitoring.

## 2024-09-11 NOTE — PROGRESS NOTE ADULT - SUBJECTIVE AND OBJECTIVE BOX
ANESTHESIA POSTOP CHECK    69y Male POSTOP DAY 1     Vital Signs Last 24 Hrs  T(C): 37.2 (10 Sep 2024 10:35), Max: 37.2 (10 Sep 2024 10:35)  T(F): 99 (10 Sep 2024 10:35), Max: 99 (10 Sep 2024 10:35)  HR: 56 (10 Sep 2024 11:31) (56 - 79)  BP: 131/68 (10 Sep 2024 11:31) (115/64 - 131/68)  BP(mean): --  RR: 18 (10 Sep 2024 11:31) (13 - 18)  SpO2: 100% (10 Sep 2024 11:31) (96% - 100%)    Parameters below as of 10 Sep 2024 11:31  Patient On (Oxygen Delivery Method): room air      I&O's Summary      [X ] NO APPARENT ANESTHESIA COMPLICATIONS      Comments: 
Interval Events:   no acute events  patient feels much better with resolution of chest pressure and improvement in heartburn. no abd pain, N/V  tolerating a full liquid diet without difficulty  XR Esophagram without leak    Hospital Medications:  acetaminophen     Tablet .. 650 milliGRAM(s) Oral every 6 hours PRN  acetaminophen   IVPB .. 1000 milliGRAM(s) IV Intermittent once PRN  ampicillin/sulbactam  IVPB 1.5 Gram(s) IV Intermittent every 6 hours  HYDROmorphone  Injectable 0.3 milliGRAM(s) IV Push every 4 hours PRN  metoprolol succinate ER 50 milliGRAM(s) Oral daily  pantoprazole  Injectable 40 milliGRAM(s) IV Push two times a day  potassium phosphate / sodium phosphate Powder (PHOS-NaK) 1 Packet(s) Oral three times a day with meals  predniSONE   Tablet 2.5 milliGRAM(s) Oral two times a day  venlafaxine 75 milliGRAM(s) Oral daily      ROS: See above.    PHYSICAL EXAM:   Vital Signs:  Vital Signs Last 24 Hrs  T(C): 36.5 (11 Sep 2024 05:35), Max: 36.7 (10 Sep 2024 18:00)  T(F): 97.7 (11 Sep 2024 05:35), Max: 98.1 (10 Sep 2024 18:00)  HR: 66 (11 Sep 2024 05:35) (56 - 72)  BP: 120/74 (11 Sep 2024 05:35) (111/88 - 131/68)  BP(mean): --  RR: 18 (11 Sep 2024 05:35) (16 - 18)  SpO2: 97% (11 Sep 2024 05:35) (96% - 100%)    Parameters below as of 11 Sep 2024 05:35  Patient On (Oxygen Delivery Method): room air        GENERAL:  NAD, resting comfortably in bed  HEENT:  sclera anicteric  RESPIRATORY:  Normal Effort  CARDIAC:  HDS  ABDOMEN:  Soft, non-tender, non-distended  SKIN:  Warm & Dry. No jaundice  NEURO:  Alert, conversant, no focal deficit    LABS:                        11.2   4.38  )-----------( 104      ( 11 Sep 2024 06:03 )             31.9     Mean Cell Volume: 85.3 fL (09-11-24 @ 06:03)    09-11    135  |  101  |  14  ----------------------------<  108<H>  3.4<L>   |  22  |  0.73    Ca    8.9      11 Sep 2024 06:03  Phos  2.0     09-11  Mg     1.90     09-11          
[facility] Division of Hospital Medicine  Rhiannon Thurman MD  Pager [phone]    Patient is a 69y old  Male who presents with a chief complaint of POEM procedure for Achalasia      SUBJECTIVE / OVERNIGHT EVENTS: pt seen earlier this AM; upset he is having so much pain; wants to go home; hasn't really had any po except for a small amt of apple sauce for his meds; pt took a few sips of apple juice in front of me as well as a few small spoons full of apple sauce; did ok but did not want to try any more due to the pain and heartburn feeling he was having      MEDICATIONS  (STANDING):  ampicillin/sulbactam  IVPB 1.5 Gram(s) IV Intermittent every 6 hours  metoprolol succinate ER 50 milliGRAM(s) Oral daily  pantoprazole  Injectable 40 milliGRAM(s) IV Push two times a day  predniSONE   Tablet 2.5 milliGRAM(s) Oral two times a day  venlafaxine 75 milliGRAM(s) Oral daily    MEDICATIONS  (PRN):  acetaminophen     Tablet .. 650 milliGRAM(s) Oral every 6 hours PRN Temp greater or equal to 38C (100.4F), Mild Pain (1 - 3)  acetaminophen   IVPB .. 1000 milliGRAM(s) IV Intermittent once PRN Moderate Pain (4 - 6)  HYDROmorphone  Injectable 0.3 milliGRAM(s) IV Push every 4 hours PRN Pain      PHYSICAL EXAM:  Vital Signs Last 24 Hrs  T(F): 99 (10 Sep 2024 10:35), Max: 99 (10 Sep 2024 10:35)  HR: 56 (10 Sep 2024 11:31) (56 - 79)  BP: 131/68 (10 Sep 2024 11:31) (116/61 - 131/68)  RR: 18 (10 Sep 2024 11:31) (15 - 18)  SpO2: 100% (10 Sep 2024 11:31) (96% - 100%)    Parameters below as of 10 Sep 2024 11:31  Patient On (Oxygen Delivery Method): room air        CONSTITUTIONAL: mild distress due to pain, thin  EYES: PERRLA; conjunctiva and sclera clear  ENMT: Moist oral mucosa; normal dentition  RESPIRATORY: Normal respiratory effort; grossly b/l AE  CARDIOVASCULAR: Regular rate and rhythm; No lower extremity edema  ABDOMEN: Nontender to palpation, normoactive bowel sounds  MUSCULOSKELETAL:  no clubbing or cyanosis of digits; no joint swelling or tenderness to palpation  PSYCH: A+O to person, place, and time; affect appropriate  NEUROLOGY: CN 2-12 are intact and symmetric; no gross sensory deficits   SKIN: No rashes; no palpable lesions    LABS:                        11.4   4.00  )-----------( 102      ( 10 Sep 2024 11:00 )             33.2     09-10    138  |  102  |  18  ----------------------------<  97  4.1   |  22  |  0.72    Ca    9.0      10 Sep 2024 11:00  Phos  2.8     09-10  Mg     2.10     09-10

## 2024-09-11 NOTE — DISCHARGE NOTE PROVIDER - CARE PROVIDER_API CALL
Jorden Koenig  Gastroenterology  [address] [place] [address], [address] [place] [address]  Phone: [phone]  Fax: [phone]  Follow Up Time: 2 weeks   Remedios Westfall  Gastroenterology  [address] [place] [address] [address]  [place] [address]  Phone: [phone]  Fax: [phone]  Follow Up Time: 2 weeks

## 2024-09-11 NOTE — DISCHARGE NOTE NURSING/CASE MANAGEMENT/SOCIAL WORK - PATIENT PORTAL LINK FT
You can access the FollowMyHealth Patient Portal offered by [facility] by registering at the following website: [email or web address]. By joining [employer]’s FollowMyHealth portal, you will also be able to view your health information using other applications (apps) compatible with our system.

## 2024-09-11 NOTE — DISCHARGE NOTE PROVIDER - ATTENDING DISCHARGE PHYSICAL EXAMINATION:
pt seen and examined by me  pain improved  lilliana clears and fulls  will try a sandwich, if lilliana plan for dc later today  VSS  CHEST b/l AE  ABD soft  a/w pain post POEM  improved  lilliana PO  outpt f/u with GI  medically stable for dc

## 2024-09-11 NOTE — DISCHARGE NOTE PROVIDER - POSTFACE STATEMENT FOR MINUTES SPENT
Patient called and left voicemail with name, phone number and , stating he has a referral and would like a call back. No referral in chart at this time. Please advise.  Electronically signed by Marissa Rogel LPN on 2024 at 11:31 AM     minutes on the discharge service.

## 2024-09-11 NOTE — DISCHARGE NOTE NURSING/CASE MANAGEMENT/SOCIAL WORK - NSDCPEFALRISK_GEN_ALL_CORE
For information on Fall & Injury Prevention, visit: [email or web address] OR  [email or web address] OR  https://www.cdc.gov/steadi/patient.html

## 2024-09-13 PROBLEM — F41.9 ANXIETY DISORDER, UNSPECIFIED: Chronic | Status: ACTIVE | Noted: 2024-08-27

## 2024-09-13 PROBLEM — K22.0 ACHALASIA OF CARDIA: Chronic | Status: ACTIVE | Noted: 2024-08-27

## 2024-09-24 ENCOUNTER — APPOINTMENT (OUTPATIENT)
Dept: GASTROENTEROLOGY | Facility: CLINIC | Age: 69
End: 2024-09-24
Payer: MEDICARE

## 2024-09-24 DIAGNOSIS — K22.0 ACHALASIA OF CARDIA: ICD-10-CM

## 2024-09-24 PROCEDURE — 99443: CPT | Mod: 93

## 2024-09-24 NOTE — REASON FOR VISIT
[Follow-up] : a follow-up of an existing diagnosis [Home] : at home, [unfilled] , at the time of the visit. [Medical Office: ([facility])___] : at the medical office located in  [Patient] : the patient [Self] : self [This encounter was initiated by telehealth (audio with video) and converted to telephone (audio only) due to technical difficulties.] : This encounter was initiated by telehealth (audio with video) and converted to telephone (audio only) due to technical difficulties.

## 2024-09-24 NOTE — PHYSICAL EXAM
[Alert] : alert [Normal Voice/Communication] : normal voice/communication [Healthy Appearing] : healthy appearing [No Acute Distress] : no acute distress [Sclera] : the sclera and conjunctiva were normal [Hearing Threshold Finger Rub Not [place]] : hearing was normal [Normal Lips/Gums] : the lips and gums were normal [Oropharynx] : the oropharynx was normal [Normal Appearance] : the appearance of the neck was normal [No Neck Mass] : no neck mass was observed [No Respiratory Distress] : no respiratory distress [No Acc Muscle Use] : no accessory muscle use [Respiration, Rhythm And Depth] : normal respiratory rhythm and effort [Auscultation Breath Sounds / Voice Sounds] : lungs were clear to auscultation bilaterally [Heart Rate And Rhythm] : heart rate was normal and rhythm regular [Normal S1, S2] : normal S1 and S2 [Murmurs] : no murmurs [Bowel Sounds] : normal bowel sounds [Abdomen Tenderness] : non-tender [No Masses] : no abdominal mass palpated [Abdomen Soft] : soft [] : no hepatosplenomegaly [Oriented To Time, Place, And Person] : oriented to person, place, and time

## 2024-09-24 NOTE — HISTORY OF PRESENT ILLNESS
[Home] : at home, [unfilled] , at the time of the visit. [Medical Office: ([facility])___] : at the medical office located in  [Verbal consent obtained from patient] : the patient, [unfilled] [FreeTextEntry1] : 69M with pmhx of type III achalasia s/p recent POEM (9/9/24) presenting for follow-up. Pt states since POEM, dysphagia has now resolved. Feels well. Now tolerating solid food although does note one episode of retrosternal pain after eating a large bolus of food. Occasional acid reflux at night when lying flat. Denies any regurgitation symptoms, dysphagia, or wt loss. Eckardt score of 1. Denies any other complaints, no abd pain, n/v/d/c, melena, hematochezia, fever/chills, or other issues.

## 2024-09-24 NOTE — ASSESSMENT
[FreeTextEntry1] : 69M with pmhx of type III achalasia s/p recent POEM (9/9/24) presenting for follow-up. Pt states since POEM, dysphagia has now resolved. Feels well. Now tolerating solid food although does note one episode of retrosternal pain after eating a large bolus of food. Occasional acid reflux at night when lying flat. Denies any regurgitation symptoms, dysphagia, or wt loss. Eckardt score of 1.  - Doing well, dysphagia symptoms resolved post POEM. - Continue Pantoprazole 40 mg twice daily for 4 weeks, then once daily. - Instructed to follow-up with Dr. Westfall for EGD w/ bravo in 6 months to assess for acid reflux and need for long term ppi use. - RTC PRN.  Total time spent to complete patient's assessment, review medical chart including labs & personal review of prior imaging and available endoscopy records, counseling and discussion of plan of care was more than 30 minutes.

## 2024-11-11 ENCOUNTER — NON-APPOINTMENT (OUTPATIENT)
Age: 69
End: 2024-11-11

## 2024-11-11 ENCOUNTER — APPOINTMENT (OUTPATIENT)
Dept: GASTROENTEROLOGY | Facility: CLINIC | Age: 69
End: 2024-11-11
Payer: MEDICARE

## 2024-11-11 VITALS
SYSTOLIC BLOOD PRESSURE: 100 MMHG | DIASTOLIC BLOOD PRESSURE: 60 MMHG | HEART RATE: 85 BPM | HEIGHT: 66 IN | WEIGHT: 139 LBS | BODY MASS INDEX: 22.34 KG/M2 | OXYGEN SATURATION: 95 %

## 2024-11-11 PROCEDURE — 99214 OFFICE O/P EST MOD 30 MIN: CPT

## 2024-11-19 ENCOUNTER — RESULT REVIEW (OUTPATIENT)
Age: 69
End: 2024-11-19

## 2024-11-19 ENCOUNTER — APPOINTMENT (OUTPATIENT)
Dept: GASTROENTEROLOGY | Facility: HOSPITAL | Age: 69
End: 2024-11-19

## 2024-11-19 ENCOUNTER — TRANSCRIPTION ENCOUNTER (OUTPATIENT)
Age: 69
End: 2024-11-19

## 2024-11-19 ENCOUNTER — OUTPATIENT (OUTPATIENT)
Dept: OUTPATIENT SERVICES | Facility: HOSPITAL | Age: 69
LOS: 1 days | End: 2024-11-19
Payer: MEDICARE

## 2024-11-19 VITALS
SYSTOLIC BLOOD PRESSURE: 145 MMHG | HEIGHT: 67 IN | WEIGHT: 139.99 LBS | HEART RATE: 73 BPM | DIASTOLIC BLOOD PRESSURE: 59 MMHG | OXYGEN SATURATION: 96 % | RESPIRATION RATE: 18 BRPM | TEMPERATURE: 98 F

## 2024-11-19 VITALS
DIASTOLIC BLOOD PRESSURE: 70 MMHG | RESPIRATION RATE: 19 BRPM | SYSTOLIC BLOOD PRESSURE: 130 MMHG | HEART RATE: 63 BPM | OXYGEN SATURATION: 97 %

## 2024-11-19 DIAGNOSIS — R13.10 DYSPHAGIA, UNSPECIFIED: ICD-10-CM

## 2024-11-19 DIAGNOSIS — Z98.890 OTHER SPECIFIED POSTPROCEDURAL STATES: Chronic | ICD-10-CM

## 2024-11-19 DIAGNOSIS — K20.80 OTHER ESOPHAGITIS WITHOUT BLEEDING: ICD-10-CM

## 2024-11-19 DIAGNOSIS — K22.0 ACHALASIA OF CARDIA: ICD-10-CM

## 2024-11-19 PROCEDURE — 88305 TISSUE EXAM BY PATHOLOGIST: CPT | Mod: 26

## 2024-11-19 PROCEDURE — 88305 TISSUE EXAM BY PATHOLOGIST: CPT

## 2024-11-19 PROCEDURE — 43239 EGD BIOPSY SINGLE/MULTIPLE: CPT

## 2024-11-19 RX ORDER — PANTOPRAZOLE 40 MG/1
40 TABLET, DELAYED RELEASE ORAL TWICE DAILY
Qty: 180 | Refills: 1 | Status: ACTIVE | COMMUNITY
Start: 2024-11-19 | End: 1900-01-01

## 2024-11-19 NOTE — PRE PROCEDURE NOTE - PRE PROCEDURE EVALUATION
Attending Physician:            Remedios Westfall    Procedure: upper endoscopy with biopsy and possible dilation    Indication for Procedure: dysphagia  ________________________________________________________  PAST MEDICAL & SURGICAL HISTORY:  Diverticulitis      Hepatitis C  resolved      Rheumatoid arthritis      Heart palpitations      COPD, moderate      H/O emphysema      Anxiety and depression      Achalasia of cardia      Hx of appendectomy      H/O left hemicolectomy  Sigmoid Resection in 2002      H/O endoscopy        ALLERGIES:  No Known Allergies    HOME MEDICATIONS:  acetaminophen 325 mg oral tablet: 2 tab(s) orally every 6 hours As needed Temp greater or equal to 38C (100.4F), Mild Pain (1 - 3)  Anoro Ellipta 62.5 mcg-25 mcg/inh inhalation powder: 1 puff(s) inhaled once a day  Effexor 75 mg oral tablet: 1 tab(s) orally once a day  metoprolol succinate 50 mg oral tablet, extended release: 1 tab(s) orally once a day  Percocet 10/325 oral tablet: takes up to 4x/day for pain  predniSONE 2.5 mg oral tablet: 1 tab(s) orally 2 times a day    AICD/PPM: [ ] yes   [ ] no    PERTINENT LAB DATA:                      PHYSICAL EXAMINATION:    T(C): --  HR: --  BP: --  RR: --  SpO2: --    Constitutional: NAD  HEENT: PERRLA, EOMI,    Neck:  No JVD  Respiratory: CTAB/L  Cardiovascular: S1 and S2  Gastrointestinal: BS+, soft, NT/ND  Extremities: No peripheral edema  Neurological: A/O x 3, no focal deficits  Psychiatric: Normal mood, normal affect  Skin: No rashes    ASA Class: I [ ]  II [ ]  III [ ]  IV [ ]    COMMENTS:    The patient is a suitable candidate for the planned procedure unless box checked [ ]  No, explain:

## 2024-11-19 NOTE — ASU DISCHARGE PLAN (ADULT/PEDIATRIC) - NS MD DC FALL RISK RISK
For information on Fall & Injury Prevention, visit: [email or web address] OR  [email or web address] OR  https://www.cdc.gov/steadi/patient.html

## 2024-11-19 NOTE — ASU DISCHARGE PLAN (ADULT/PEDIATRIC) - FINANCIAL ASSISTANCE
[facility] provides services at a reduced cost to those who are determined to be eligible through [facility]’s financial assistance program. Information regarding [facility]’s financial assistance program can be found by going to [email or web address] or by calling [phone].

## 2024-11-20 LAB — SURGICAL PATHOLOGY STUDY: SIGNIFICANT CHANGE UP

## 2025-04-14 ENCOUNTER — RX RENEWAL (OUTPATIENT)
Age: 70
End: 2025-04-14

## 2025-06-11 NOTE — ASU PREOP CHECKLIST - NS PREOP CHK INSULIN PUMP THERAPY
Detail Level: Detailed
Quality 47: Advance Care Plan: Advance Care Planning discussed and documented; advance care plan or surrogate decision maker documented in the medical record.
Quality 226: Preventive Care And Screening: Tobacco Use: Screening And Cessation Intervention: Patient screened for tobacco use and is an ex/non-smoker
Discontinued per anesthesia

## (undated) DEVICE — CARTRIDGE ERBEJET 2 PUMP

## (undated) DEVICE — UNDERPAD LINEN SAVER 17 X 24"

## (undated) DEVICE — DRSG CURITY GAUZE SPONGE 4 X 4" 12-PLY NON-STERILE

## (undated) DEVICE — BITE BLOCK ADULT 20 X 27MM (GREEN)

## (undated) DEVICE — FORCEP ELECTROSURGICAL HEMOSTATIC 2.75MM X 165MM DISP

## (undated) DEVICE — BASIN EMESIS 10IN GRADUATED MAUVE

## (undated) DEVICE — BIOPSY FORCEP RADIAL JAW 4 STANDARD WITH NEEDLE

## (undated) DEVICE — TUBING SUCTION 20FT

## (undated) DEVICE — DRSG 2X2

## (undated) DEVICE — Device

## (undated) DEVICE — CLAMP BX HOT RAD JAW 3

## (undated) DEVICE — FOLEY HOLDER STATLOCK 2 WAY ADULT

## (undated) DEVICE — PACK IV START WITH CHG

## (undated) DEVICE — ELCTR ECG CONDUCTIVE ADHESIVE

## (undated) DEVICE — CATH IV SAFE BC 22G X 1" (BLUE)

## (undated) DEVICE — CONTAINER FORMALIN 80ML YELLOW

## (undated) DEVICE — TUBING IV SET GRAVITY 3Y 100" MACRO

## (undated) DEVICE — PROBE HYBRIDKNIFE T TYPE OD 2.3MMX1.9M

## (undated) DEVICE — DENTURE CUP PINK

## (undated) DEVICE — SALIVA EJECTOR (BLUE)

## (undated) DEVICE — CATH IV SAFE BC 20G X 1.16" (PINK)

## (undated) DEVICE — SYR ALLIANCE II INFLATION 60ML

## (undated) DEVICE — BALLOON US ENDO

## (undated) DEVICE — SOL INJ NS 0.9% 500ML 2 PORT

## (undated) DEVICE — TUBING SUCTION CONN 6FT STERILE

## (undated) DEVICE — TUBING MEDI-VAC W MAXIGRIP CONNECTORS 1/4"X6'

## (undated) DEVICE — LUBRICATING JELLY HR ONE SHOT 3G

## (undated) DEVICE — SUCTION YANKAUER NO CONTROL VENT

## (undated) DEVICE — ATTACHMENT DISTAL 4X11.35MM

## (undated) DEVICE — GOWN LG

## (undated) DEVICE — BIOPSY FORCEP COLD DISP

## (undated) DEVICE — SENSOR O2 FINGER ADULT

## (undated) DEVICE — DRSG BANDAID 0.75X3"

## (undated) DEVICE — ESU ERBE 044850: Type: DURABLE MEDICAL EQUIPMENT